# Patient Record
Sex: MALE | Race: BLACK OR AFRICAN AMERICAN | NOT HISPANIC OR LATINO | Employment: FULL TIME | URBAN - METROPOLITAN AREA
[De-identification: names, ages, dates, MRNs, and addresses within clinical notes are randomized per-mention and may not be internally consistent; named-entity substitution may affect disease eponyms.]

---

## 2017-02-23 ENCOUNTER — OFFICE VISIT (OUTPATIENT)
Dept: FAMILY MEDICINE | Facility: CLINIC | Age: 42
End: 2017-02-23
Payer: COMMERCIAL

## 2017-02-23 VITALS
WEIGHT: 220.88 LBS | TEMPERATURE: 98 F | SYSTOLIC BLOOD PRESSURE: 140 MMHG | HEART RATE: 83 BPM | OXYGEN SATURATION: 96 % | DIASTOLIC BLOOD PRESSURE: 100 MMHG | HEIGHT: 71 IN | BODY MASS INDEX: 30.92 KG/M2

## 2017-02-23 DIAGNOSIS — M25.511 ACUTE PAIN OF RIGHT SHOULDER: Primary | ICD-10-CM

## 2017-02-23 PROCEDURE — 99999 PR PBB SHADOW E&M-EST. PATIENT-LVL III: CPT | Mod: PBBFAC,,,

## 2017-02-23 PROCEDURE — 99213 OFFICE O/P EST LOW 20 MIN: CPT | Mod: 25,S$GLB,,

## 2017-02-23 PROCEDURE — 20610 DRAIN/INJ JOINT/BURSA W/O US: CPT | Mod: S$GLB,,,

## 2017-02-23 PROCEDURE — 3080F DIAST BP >= 90 MM HG: CPT | Mod: S$GLB,,,

## 2017-02-23 PROCEDURE — 3077F SYST BP >= 140 MM HG: CPT | Mod: S$GLB,,,

## 2017-02-23 PROCEDURE — 1160F RVW MEDS BY RX/DR IN RCRD: CPT | Mod: S$GLB,,,

## 2017-02-23 RX ORDER — HYDROCODONE BITARTRATE AND ACETAMINOPHEN 10; 325 MG/1; MG/1
1 TABLET ORAL 4 TIMES DAILY PRN
Qty: 40 TABLET | Refills: 0 | Status: SHIPPED | OUTPATIENT
Start: 2017-04-24 | End: 2017-05-24

## 2017-02-23 RX ORDER — TRIAMCINOLONE ACETONIDE 40 MG/ML
40 INJECTION, SUSPENSION INTRA-ARTICULAR; INTRAMUSCULAR
Status: COMPLETED | OUTPATIENT
Start: 2017-02-23 | End: 2017-02-23

## 2017-02-23 RX ADMIN — TRIAMCINOLONE ACETONIDE 40 MG: 40 INJECTION, SUSPENSION INTRA-ARTICULAR; INTRAMUSCULAR at 11:02

## 2017-02-23 NOTE — PROGRESS NOTES
Chief Complaint   Patient presents with    Shoulder Pain       HPI  Ranjith Peguero is a 41 y.o. male who presents to the office with very severe right shoulder pain, this is been gradually getting worse over the last week or 2.  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  There is been no traumatic injury or unusual activity for the patient.  Patient states that his pain is 8/10.  There is no radiation to the hand.  No loss of hand .  Patient has tried some Aleve and some sport tape with not much improvement.  Pt is known to me.    HPI    PAST MEDICAL HISTORY:  Past Medical History   Diagnosis Date    Hypertension        PAST SURGICAL HISTORY:  History reviewed. No pertinent past surgical history.    SOCIAL HISTORY:  Social History     Social History    Marital status:      Spouse name: N/A    Number of children: N/A    Years of education: N/A     Occupational History    Not on file.     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.  .  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         FAMILY HISTORY:  Family History   Problem Relation Age of Onset    Hypertension Mother     Hypertension Father        ALLERGIES AND MEDICATIONS: updated and reviewed.  Review of patient's allergies indicates:  No Known Allergies  Current Outpatient Prescriptions   Medication Sig Dispense Refill    allopurinol (ZYLOPRIM) 100 MG tablet Take 1 tablet (100 mg total) by mouth 3 (three) times daily. 30 tablet 2    amlodipine-benazepril 5-20 mg (LOTREL) 5-20 mg per capsule TAKE ONE CAPSULE BY MOUTH DAILY 90 capsule 1    indomethacin (INDOCIN) 50 MG capsule Take 1 capsule (50 mg total) by mouth 3 (three) times daily as needed. 45 capsule 3     "meloxicam (MOBIC) 15 MG tablet Take 15 mg by mouth once daily.      [START ON 4/24/2017] hydrocodone-acetaminophen 10-325mg (NORCO)  mg Tab Take 1 tablet by mouth 4 (four) times daily as needed. 40 tablet 0    triamcinolone acetonide 0.1% (KENALOG) 0.1 % ointment Apply topically 2 (two) times daily. 80 g 2     Current Facility-Administered Medications   Medication Dose Route Frequency Provider Last Rate Last Dose    triamcinolone acetonide injection 40 mg  40 mg Intramuscular 1 time in Clinic/HOD Jose Alberto Jr., MD           ROS  Review of Systems   Constitutional: Negative for appetite change and unexpected weight change.   Musculoskeletal: Positive for arthralgias and neck stiffness. Negative for back pain and joint swelling.       Physical Exam  Vitals:    02/23/17 0918   BP: (!) 140/100   Pulse: 83   Temp: 98.4 °F (36.9 °C)    Body mass index is 30.81 kg/(m^2).  Weight: 100.2 kg (220 lb 14.4 oz)   Height: 5' 11" (180.3 cm)     Physical Exam   Constitutional: He is oriented to person, place, and time. He appears well-developed and well-nourished. No distress.   Neck: Neck supple.   There is tenderness over the right trapezius ridge.   Musculoskeletal: Normal range of motion.   Right shoulder has full range of motion passively, definitely diminished actively.  Right subacromial area is definitely tender.   Neurological: He is alert and oriented to person, place, and time.   Psychiatric: He has a normal mood and affect.   Vitals reviewed.    Procedure note: After discussing options, time out, I injected 1 cc of Kenalog 40, 4 cc of 2% Xylocaine plain to the right subacromial space.  The patient tolerated this quite well.    Health Maintenance       Date Due Completion Date    TETANUS VACCINE 10/5/1991 ---    Lipid Panel 8/22/2016 8/22/2011          Assessment & Plan    1. Acute pain of right shoulder  We discussed several options, such as orthopedic referral, physical therapy, home management, patient " opted to have an injection to the right subacromial space.  - triamcinolone acetonide injection 40 mg; Inject 1 mL (40 mg total) into the muscle one time.  - hydrocodone-acetaminophen 10-325mg (NORCO)  mg Tab; Take 1 tablet by mouth 4 (four) times daily as needed.  Dispense: 40 tablet; Refill: 0      No Follow-up on file.    Additionally, I showed the patient range of motion exercises, he should use heat/ice, hopefully this will improve while the patient is off for the next 10 days or so.

## 2017-02-23 NOTE — MR AVS SNAPSHOT
[facility]  [address]  [place] [address]  Phone: [phone]  Fax: [phone]                  Ranjith Peguero   2017 9:20 AM   Office Visit    Description:  Male : 1975   Provider:  Jose Alberto Jr., MD   Department:  [facility]           Reason for Visit     Shoulder Pain           Diagnoses this Visit        Comments    Acute pain of right shoulder    -  Primary            To Do List           Goals (5 Years of Data)     None       These Medications        Disp Refills Start End    hydrocodone-acetaminophen 10-325mg (NORCO)  mg Tab 40 tablet 0 2017    Take 1 tablet by mouth 4 (four) times daily as needed. - Oral    Pharmacy: [id number] - [place]EN [address] AT [facility] #: [phone]         [facility] On Call     [facility] On Call Nurse Care Line -  Assistance  Registered nurses in the [facility] On Call Center provide clinical advisement, health education, appointment booking, and other advisory services.  Call for this free service at [phone].             Medications           Message regarding Medications     Verify the changes and/or additions to your medication regime listed below are the same as discussed with your clinician today.  If any of these changes or additions are incorrect, please notify your healthcare provider.        START taking these NEW medications        Refills    hydrocodone-acetaminophen 10-325mg (NORCO)  mg Tab 0    Starting on: 2017    Sig: Take 1 tablet by mouth 4 (four) times daily as needed.    Class: Print    Route: Oral      These medications were administered today        Dose Freq    triamcinolone acetonide injection 40 mg 40 mg Clinic/HOD 1 time    Sig: Inject 1 mL (40 mg total) into the muscle one time.    Class: Normal    Route: Intramuscular           Verify that the below list of medications is an accurate representation of the  "medications you are currently taking.  If none reported, the list may be blank. If incorrect, please contact your healthcare provider. Carry this list with you in case of emergency.           Current Medications     allopurinol (ZYLOPRIM) 100 MG tablet Take 1 tablet (100 mg total) by mouth 3 (three) times daily.    amlodipine-benazepril 5-20 mg (LOTREL) 5-20 mg per capsule TAKE ONE CAPSULE BY MOUTH DAILY    indomethacin (INDOCIN) 50 MG capsule Take 1 capsule (50 mg total) by mouth 3 (three) times daily as needed.    meloxicam (MOBIC) 15 MG tablet Take 15 mg by mouth once daily.    hydrocodone-acetaminophen 10-325mg (NORCO)  mg Tab Starting on Apr 24, 2017. Take 1 tablet by mouth 4 (four) times daily as needed.    triamcinolone acetonide 0.1% (KENALOG) 0.1 % ointment Apply topically 2 (two) times daily.           Clinical Reference Information           Your Vitals Were     BP Pulse Temp Height Weight SpO2    140/100 (BP Location: Left arm, Patient Position: Sitting, BP Method: Manual) 83 98.4 °F (36.9 °C) 5' 11" (1.803 m) 100.2 kg (220 lb 14.4 oz) 96%    BMI                30.81 kg/m2          Blood Pressure          Most Recent Value    BP  (!)  140/100      Allergies as of 2/23/2017     No Known Allergies      Immunizations Administered on Date of Encounter - 2/23/2017     None      [facility] Sign-Up     Activating your [facility] account is as easy as 1-2-3!     1) Visit [email or web address], select Sign Up Now, enter this activation code and your date of birth, then select Next.  [email or web address]  Expires: 4/9/2017  9:52 AM      2) Create a username and password to use when you visit [facility] in the future and select a security question in case you lose your password and select Next.    3) Enter your e-mail address and click Sign Up!    Additional Information  If you have questions, please e-mail [email or web address] or call [phone] to talk to our [facility] staff. Remember, [facility] is NOT to be used for " urgent needs. For medical emergencies, dial 911.         Instructions      Shoulder Impingement Syndrome  The rotator cuff is a group of muscles and tendons that surround the shoulder joint. These muscles and tendons hold the arm in its joint. They help the shoulder move. The rotator cuff muscles and tendons can become irritated from repeated rubbing against the shoulder bone. This is called shoulder impingement syndrome or rotator cuff tendonitis.    If your case is mild, you may only need to rest the shoulder and then do certain exercises to strengthen the muscles. You can also take anti-inflammatory medicines. Steroid injections into the shoulder can ease inflammation. But you can have only a limited number of these. If the condition gets worse, your shoulder muscles may become thin and weak. This can lead to a rotator cuff tear.  Symptoms of shoulder impingement syndrome may include:  · Shoulder pain that gets worse when you raise your arm overhead  · Weakness of the shoulder muscles when you use your arm overhead  · Popping and clicking when you move your shoulder  · Shoulder pain that wakes you up at night, especially when you sleep on the affected shoulder  · Sudden pain in your shoulder when you lift or reach  Home care  Follow these tips to take care of yourself at home:  · Avoid activities that make your pain worse. These include raising your arms overhead, repeating the same motion over and over, or lifting heavy objects.  · Dont hold your arm in one position for a long time. Keep it moving.  · Put an ice pack on the sore area for 20 minutes every 1 to 2 hours for the first day. You can make an ice pack by putting ice cubes in a plastic bag. Wrap the bag in a towel before putting it on your shoulder. A frozen bag of peas or something similar can also be used as an ice pack. Use the ice packs 3 to 4 times a day for the next 2 days. Continue using the ice to relieve of pain and swelling as needed.  · You  may take acetaminophen or ibuprofen to control pain, unless another medicine was prescribed. If prednisone was prescribed, dont take anti-inflammatory medicines. If you have chronic liver or kidney disease or ever had a stomach ulcer or gastrointestinal bleeding, talk with your doctor before using these medicines.  · After your symptoms ease, you may get physical therapy or start a home exercise program. This can strengthen your shoulder muscles and help your range of motion. Talk with your doctor about what is best for your condition.  Follow-up care  Follow up with your healthcare provider, or as advised.  When to seek medical advice  Call your healthcare provider right away if any of these occur:  · Shoulder pain that gets worse and wakes you up at night  · Your shoulder or arm swells  · Numbness, tingling, or pain that travels down the arm to the hand  · Loss of shoulder strength  · Fever or chills  Date Last Reviewed: 8/1/2016  © 2554-9405 [employer]. [address] [place] [address]. All rights reserved. This information is not intended as a substitute for professional medical care. Always follow your healthcare professional's instructions.             Language Assistance Services     ATTENTION: Language assistance services are available, free of charge. Please call [phone].      ATENCIÓN: Si chula maría, tiene a guy disposición servicios gratuitos de asistencia lingüística. Llame al [phone].     ROSY Ý: N?u b?n nói Ti?ng Vi?t, có các d?ch v? h? tr? ngôn ng? mi?n phí dành cho b?n. G?i s? [phone].         [facility] complies with applicable Federal civil rights laws and does not discriminate on the basis of race, color, national origin, age, disability, or sex.

## 2017-02-23 NOTE — PATIENT INSTRUCTIONS
Shoulder Impingement Syndrome  The rotator cuff is a group of muscles and tendons that surround the shoulder joint. These muscles and tendons hold the arm in its joint. They help the shoulder move. The rotator cuff muscles and tendons can become irritated from repeated rubbing against the shoulder bone. This is called shoulder impingement syndrome or rotator cuff tendonitis.    If your case is mild, you may only need to rest the shoulder and then do certain exercises to strengthen the muscles. You can also take anti-inflammatory medicines. Steroid injections into the shoulder can ease inflammation. But you can have only a limited number of these. If the condition gets worse, your shoulder muscles may become thin and weak. This can lead to a rotator cuff tear.  Symptoms of shoulder impingement syndrome may include:  · Shoulder pain that gets worse when you raise your arm overhead  · Weakness of the shoulder muscles when you use your arm overhead  · Popping and clicking when you move your shoulder  · Shoulder pain that wakes you up at night, especially when you sleep on the affected shoulder  · Sudden pain in your shoulder when you lift or reach  Home care  Follow these tips to take care of yourself at home:  · Avoid activities that make your pain worse. These include raising your arms overhead, repeating the same motion over and over, or lifting heavy objects.  · Dont hold your arm in one position for a long time. Keep it moving.  · Put an ice pack on the sore area for 20 minutes every 1 to 2 hours for the first day. You can make an ice pack by putting ice cubes in a plastic bag. Wrap the bag in a towel before putting it on your shoulder. A frozen bag of peas or something similar can also be used as an ice pack. Use the ice packs 3 to 4 times a day for the next 2 days. Continue using the ice to relieve of pain and swelling as needed.  · You may take acetaminophen or ibuprofen to control pain, unless another  medicine was prescribed. If prednisone was prescribed, dont take anti-inflammatory medicines. If you have chronic liver or kidney disease or ever had a stomach ulcer or gastrointestinal bleeding, talk with your doctor before using these medicines.  · After your symptoms ease, you may get physical therapy or start a home exercise program. This can strengthen your shoulder muscles and help your range of motion. Talk with your doctor about what is best for your condition.  Follow-up care  Follow up with your healthcare provider, or as advised.  When to seek medical advice  Call your healthcare provider right away if any of these occur:  · Shoulder pain that gets worse and wakes you up at night  · Your shoulder or arm swells  · Numbness, tingling, or pain that travels down the arm to the hand  · Loss of shoulder strength  · Fever or chills  Date Last Reviewed: 8/1/2016  © 6200-1848 [employer]. [address] [place] [address], [place] [address]. All rights reserved. This information is not intended as a substitute for professional medical care. Always follow your healthcare professional's instructions.

## 2017-06-14 RX ORDER — AMLODIPINE AND BENAZEPRIL HYDROCHLORIDE 5; 20 MG/1; MG/1
1 CAPSULE ORAL DAILY
Qty: 90 CAPSULE | Refills: 1 | Status: SHIPPED | OUTPATIENT
Start: 2017-06-14 | End: 2018-03-16 | Stop reason: SDUPTHER

## 2017-08-03 ENCOUNTER — OFFICE VISIT (OUTPATIENT)
Dept: FAMILY MEDICINE | Facility: CLINIC | Age: 42
End: 2017-08-03
Payer: COMMERCIAL

## 2017-08-03 VITALS
OXYGEN SATURATION: 97 % | RESPIRATION RATE: 17 BRPM | SYSTOLIC BLOOD PRESSURE: 142 MMHG | DIASTOLIC BLOOD PRESSURE: 90 MMHG | HEIGHT: 71 IN | BODY MASS INDEX: 29.5 KG/M2 | TEMPERATURE: 98 F | HEART RATE: 80 BPM | WEIGHT: 210.75 LBS

## 2017-08-03 DIAGNOSIS — M25.521 RIGHT ELBOW PAIN: Primary | ICD-10-CM

## 2017-08-03 DIAGNOSIS — M25.521 RIGHT ELBOW PAIN: ICD-10-CM

## 2017-08-03 PROCEDURE — 3008F BODY MASS INDEX DOCD: CPT | Mod: S$GLB,,, | Performed by: INTERNAL MEDICINE

## 2017-08-03 PROCEDURE — 99214 OFFICE O/P EST MOD 30 MIN: CPT | Mod: S$GLB,,, | Performed by: INTERNAL MEDICINE

## 2017-08-03 PROCEDURE — 99999 PR PBB SHADOW E&M-EST. PATIENT-LVL III: CPT | Mod: PBBFAC,,, | Performed by: INTERNAL MEDICINE

## 2017-08-03 RX ORDER — NAPROXEN 500 MG/1
TABLET ORAL
Qty: 180 TABLET | Refills: 0 | OUTPATIENT
Start: 2017-08-03

## 2017-08-03 RX ORDER — METHYLPREDNISOLONE 4 MG/1
TABLET ORAL
Qty: 21 TABLET | Refills: 0 | Status: SHIPPED | OUTPATIENT
Start: 2017-08-03 | End: 2017-11-09 | Stop reason: ALTCHOICE

## 2017-08-03 RX ORDER — NAPROXEN 500 MG/1
500 TABLET ORAL 2 TIMES DAILY
Qty: 60 TABLET | Refills: 0 | Status: SHIPPED | OUTPATIENT
Start: 2017-08-03 | End: 2017-09-15 | Stop reason: SDUPTHER

## 2017-08-04 NOTE — PROGRESS NOTES
Subjective:       Patient ID: Ranjith Peguero is a 41 y.o. male.    Chief Complaint: Elbow Pain (right  )    The patient is a  who complains of 3 month history of worsening pain in his right elbow.  He knows now that he has some weakness and is beginning to drop things.  His pain at its worse is 7-8 out of 10.      Elbow Pain   This is a new problem. The current episode started more than 1 month ago. The problem occurs intermittently. The problem has been gradually worsening. Associated symptoms include arthralgias and weakness. Pertinent negatives include no joint swelling or numbness. He has tried NSAIDs for the symptoms. The treatment provided no relief.     Review of Systems   Musculoskeletal: Positive for arthralgias. Negative for joint swelling.   Neurological: Positive for weakness. Negative for numbness.       Objective:      Physical Exam   Constitutional: He is oriented to person, place, and time. He appears well-developed and well-nourished. No distress.   HENT:   Head: Normocephalic and atraumatic.   Musculoskeletal:        Right elbow: He exhibits normal range of motion, no swelling and no effusion. No tenderness found.   Neurological: He is alert and oriented to person, place, and time.   Skin: Skin is warm and dry. No rash noted.   Psychiatric: He has a normal mood and affect.   Vitals reviewed.      Assessment:       1. Right elbow pain        Plan:       Ranjith was seen today for elbow pain.    Diagnoses and all orders for this visit:    Right elbow pain - , tendonitis.  F/u if no improvement  -     methylPREDNISolone (MEDROL, ANNIE,) 4 mg tablet; use as directed  -     naproxen (EC NAPROSYN) 500 MG EC tablet; Take 1 tablet (500 mg total) by mouth 2 (two) times daily.

## 2017-09-15 DIAGNOSIS — M25.521 RIGHT ELBOW PAIN: ICD-10-CM

## 2017-09-15 RX ORDER — NAPROXEN 500 MG/1
TABLET ORAL
Qty: 60 TABLET | Refills: 0 | Status: SHIPPED | OUTPATIENT
Start: 2017-09-15 | End: 2018-02-24

## 2017-09-23 ENCOUNTER — OFFICE VISIT (OUTPATIENT)
Dept: FAMILY MEDICINE | Facility: CLINIC | Age: 42
End: 2017-09-23
Payer: COMMERCIAL

## 2017-09-23 VITALS
HEART RATE: 86 BPM | DIASTOLIC BLOOD PRESSURE: 80 MMHG | WEIGHT: 211.88 LBS | SYSTOLIC BLOOD PRESSURE: 110 MMHG | HEIGHT: 71 IN | RESPIRATION RATE: 17 BRPM | TEMPERATURE: 98 F | OXYGEN SATURATION: 97 % | BODY MASS INDEX: 29.66 KG/M2

## 2017-09-23 DIAGNOSIS — M62.838 MUSCLE SPASM: Primary | ICD-10-CM

## 2017-09-23 LAB
BILIRUB SERPL-MCNC: NORMAL MG/DL
BLOOD URINE, POC: NORMAL
COLOR, POC UA: YELLOW
GLUCOSE UR QL STRIP: NORMAL
KETONES UR QL STRIP: NORMAL
LEUKOCYTE ESTERASE URINE, POC: NORMAL
NITRITE, POC UA: NORMAL
PH, POC UA: 5
PROTEIN, POC: NORMAL
SPECIFIC GRAVITY, POC UA: 1020
UROBILINOGEN, POC UA: NORMAL

## 2017-09-23 PROCEDURE — 99213 OFFICE O/P EST LOW 20 MIN: CPT | Mod: 25,S$GLB,, | Performed by: NURSE PRACTITIONER

## 2017-09-23 PROCEDURE — 99999 PR PBB SHADOW E&M-EST. PATIENT-LVL IV: CPT | Mod: PBBFAC,,, | Performed by: NURSE PRACTITIONER

## 2017-09-23 PROCEDURE — 3074F SYST BP LT 130 MM HG: CPT | Mod: S$GLB,,, | Performed by: NURSE PRACTITIONER

## 2017-09-23 PROCEDURE — 81001 URINALYSIS AUTO W/SCOPE: CPT | Mod: S$GLB,,, | Performed by: NURSE PRACTITIONER

## 2017-09-23 PROCEDURE — 3008F BODY MASS INDEX DOCD: CPT | Mod: S$GLB,,, | Performed by: NURSE PRACTITIONER

## 2017-09-23 PROCEDURE — 3079F DIAST BP 80-89 MM HG: CPT | Mod: S$GLB,,, | Performed by: NURSE PRACTITIONER

## 2017-09-23 PROCEDURE — 96372 THER/PROPH/DIAG INJ SC/IM: CPT | Mod: S$GLB,,, | Performed by: NURSE PRACTITIONER

## 2017-09-23 RX ORDER — METHOCARBAMOL 500 MG/1
TABLET, FILM COATED ORAL
Qty: 64 TABLET | Refills: 0 | Status: SHIPPED | OUTPATIENT
Start: 2017-09-23 | End: 2018-02-24

## 2017-09-23 RX ORDER — KETOROLAC TROMETHAMINE 30 MG/ML
30 INJECTION, SOLUTION INTRAMUSCULAR; INTRAVENOUS
Status: COMPLETED | OUTPATIENT
Start: 2017-09-23 | End: 2017-09-23

## 2017-09-23 RX ADMIN — KETOROLAC TROMETHAMINE 30 MG: 30 INJECTION, SOLUTION INTRAMUSCULAR; INTRAVENOUS at 01:09

## 2017-09-23 NOTE — PATIENT INSTRUCTIONS
Back Spasm (No Trauma)    Spasm of the back muscles can occur after a sudden forceful twisting or bending force (such as in a car accident), after a simple awkward movement, or after lifting something heavy with poor body positioning. In any case, muscle spasm adds to the pain. Sleeping in an awkward position or on a poor quality mattress can also cause this. Some people respond to emotional stress by tensing the muscles of their back.  Pain that continues may need further evaluation or other types of treatment such as physical therapy.  You don't always need X-rays for the initial evaluation of back pain, unless you had a physical injury such as from a car accident or fall. If your pain continues and doesn't respond to medical treatment, X-rays and other tests may then be done.   Home care  · As soon as possible, start sitting or walking again to avoid problems from prolonged bed rest (muscle weakness, worsening back stiffness and pain, blood clots in the legs).  · When in bed, try to find a position of comfort. A firm mattress is best. Try lying flat on your back with pillows under your knees. You can also try lying on your side with your knees bent up toward your chest and a pillow between your knees.  · Avoid prolonged sitting, long car rides, or travel. This puts more stress on the lower back than standing or walking.   · During the first 24 to 72 hours after an injury or flare-up, apply an ice pack to the painful area for 20 minutes, then remove it for 20 minutes. Do this over a period of 60 to 90 minutes or several times a day. This will reduce swelling and pain. Always wrap ice packs in a thin towel.  · You can start with ice, then switch to heat. Heat (hot shower, hot bath, or heating pad) reduces pain, and works well for muscle spasms. Apply heat to the painful area for 20 minutes, then remove it for 20 minutes. Do this over a period of 60 to 90 minutes or several times a day. Do not sleep on a heating  pad as it can burn or damage skin.  · Alternate ice and heat therapies.  · Be aware of safe lifting methods and do not lift anything over 15 pounds until all the pain is gone.  Gentle stretching will help your back heal faster. Do this simple routine 2 to 3 times a day until your back is feeling better.  · Lie on your back with your knees bent and both feet on the ground  · Slowly raise your left knee to your chest as you flatten your lower back against the floor. Hold for 20 to 30 seconds.  · Relax and repeat the exercise with your right knee.  · Do 2 to 3 of these exercises for each leg.  · Repeat, hugging both knees to your chest at the same time.  · Do not bounce, but use a gentle pull.  Medicines  Talk to your doctor before using medicine, especially if you have other medical problems or are taking other medicines.  You may use acetaminophen or ibuprofen to control pain, unless your healthcare provider prescribed another pain medicine. If you have a chronic condition such as diabetes, liver or kidney disease, stomach ulcer, or gastrointestinal bleeding, or are taking blood thinners, talk with your healthcare provider before taking any medicines.  Be careful if you are given prescription pain medicine, narcotics, or medicine for muscle spasm. They can cause drowsiness, affect your coordination, reflexes, or judgment. Do not drive or operate heavy machinery when taking these medicines. Take pain medicine only as prescribed by your healthcare provider.  Follow-up care  Follow up with your doctor, or as advised. Physical therapy or further tests may be needed.  If X-rays were taken, they may be reviewed by a radiologist. You will be notified of any new findings that may affect your care.  Call 911  Seek emergency medical care if any of these occur:  · Trouble breathing  · Confusion  · Drowsiness or trouble awakening  · Fainting or loss of consciousness  · Rapid or very slow heart rate  · Loss of bowel or bladder  control  When to seek medical advice  Call your healthcare provider right away if any of these occur:  · Pain becomes worse or spreads to your legs  · Weakness or numbness in one or both legs  · Numbness in the groin or genital area  · Unexplained fever over 100.4ºF (38.0ºC)  · Burning or pain when passing urine  Date Last Reviewed: 6/1/2016  © 2097-5480 [employer]. [address] [place] [address], [place] [id number]. All rights reserved. This information is not intended as a substitute for professional medical care. Always follow your healthcare professional's instructions.

## 2017-09-23 NOTE — PROGRESS NOTES
Subjective:       Patient ID: Ranjith Peguero is a 41 y.o. male who presents to urgent care with complaints of lower to mid back pain for about a week, denies any trauma prior to developing pain, has been using otc meds with little help, works for [employer], says urine is dark, has been sweating a lot and he says he drinks a lot of water.         Chief Complaint: Low-back Pain    HPI  Review of Systems   Constitutional: Positive for activity change. Negative for appetite change, chills, diaphoresis, fatigue and fever.   Respiratory: Negative for apnea, cough, choking, chest tightness and shortness of breath.    Cardiovascular: Negative for chest pain, palpitations and leg swelling.   Genitourinary: Positive for flank pain. Negative for difficulty urinating, dysuria, enuresis, frequency, genital sores and hematuria.   Musculoskeletal: Positive for back pain. Negative for gait problem, joint swelling, myalgias and neck pain.   Neurological: Negative for dizziness, facial asymmetry, light-headedness and headaches.   Psychiatric/Behavioral: Negative for agitation, behavioral problems, confusion, decreased concentration and dysphoric mood.       Objective:      Physical Exam   Constitutional: He is oriented to person, place, and time. He appears well-developed and well-nourished. No distress.   Cardiovascular: Normal rate, regular rhythm and normal heart sounds.    Pulmonary/Chest: Effort normal and breath sounds normal. No respiratory distress. He has no wheezes.   Abdominal: Soft. Bowel sounds are normal. He exhibits no distension. There is no tenderness. There is no guarding.   Musculoskeletal: Normal range of motion. He exhibits no edema or deformity.        Arms:  Neurological: He is alert and oriented to person, place, and time. No cranial nerve deficit.   Skin: Skin is warm and dry. No rash noted.   Psychiatric: He has a normal mood and affect. His behavior is normal. Judgment and thought content normal.   Nursing  note and vitals reviewed.      Assessment:       1. Muscle spasm        Plan:       Ranjith was seen today for low-back pain.    Diagnoses and all orders for this visit:    Muscle spasm  -     POCT urinalysis, dipstick or tablet reag    Other orders  -     ketorolac injection 30 mg; Inject 30 mg into the muscle one time.  -     methocarbamol (ROBAXIN) 500 MG Tab; 1500mg every 6h x 2d;  then 1000mg every 6h x 5d        Education  Pt has been given instructions populated from [employer] database and those entered into patient instructions field and has verbalized understanding of the after visit summary and information contained wherein.    Follow Up  if no better 3-5 days fu with pcp  Work slip given.    In Case of Emergency   May go to ER for acute shortness of breath, lightheadedness, fever, or any other emergent complaints or changes in condition.

## 2017-09-23 NOTE — LETTER
September 23, 2017      Ranjith Peguero   [address] [facility] [address]             [address] [address]  Phone: [phone]  Fax: [phone] Ranjith Peguero    Was treated here on 09/23/2017    May Return to work on 09/25/2017.    No Restrictions            Karime Carr NP

## 2017-11-09 ENCOUNTER — OFFICE VISIT (OUTPATIENT)
Dept: FAMILY MEDICINE | Facility: CLINIC | Age: 42
End: 2017-11-09
Payer: COMMERCIAL

## 2017-11-09 VITALS
WEIGHT: 215.81 LBS | DIASTOLIC BLOOD PRESSURE: 82 MMHG | OXYGEN SATURATION: 97 % | RESPIRATION RATE: 17 BRPM | SYSTOLIC BLOOD PRESSURE: 136 MMHG | HEART RATE: 75 BPM | HEIGHT: 71 IN | BODY MASS INDEX: 30.21 KG/M2 | TEMPERATURE: 98 F

## 2017-11-09 DIAGNOSIS — S39.012A LUMBAR STRAIN, INITIAL ENCOUNTER: ICD-10-CM

## 2017-11-09 DIAGNOSIS — M25.521 RIGHT ELBOW PAIN: ICD-10-CM

## 2017-11-09 DIAGNOSIS — J20.9 ACUTE BRONCHITIS, UNSPECIFIED ORGANISM: Primary | ICD-10-CM

## 2017-11-09 PROCEDURE — 99999 PR PBB SHADOW E&M-EST. PATIENT-LVL III: CPT | Mod: PBBFAC,,, | Performed by: INTERNAL MEDICINE

## 2017-11-09 PROCEDURE — 99214 OFFICE O/P EST MOD 30 MIN: CPT | Mod: S$GLB,,, | Performed by: INTERNAL MEDICINE

## 2017-11-09 RX ORDER — BENZONATATE 200 MG/1
200 CAPSULE ORAL 3 TIMES DAILY PRN
Qty: 30 CAPSULE | Refills: 0 | Status: SHIPPED | OUTPATIENT
Start: 2017-11-09 | End: 2017-11-19

## 2017-11-09 RX ORDER — PREDNISONE 20 MG/1
TABLET ORAL
Qty: 7 TABLET | Refills: 0 | Status: SHIPPED | OUTPATIENT
Start: 2017-11-09 | End: 2018-02-24

## 2017-11-09 RX ORDER — ALBUTEROL SULFATE 90 UG/1
2 AEROSOL, METERED RESPIRATORY (INHALATION) EVERY 6 HOURS PRN
Qty: 18 G | Refills: 0 | Status: SHIPPED | OUTPATIENT
Start: 2017-11-09 | End: 2018-02-24

## 2017-11-09 NOTE — PROGRESS NOTES
Subjective:       Patient ID: Ranjith Peguero is a 42 y.o. male.    Chief Complaint: Cough and Chest congestion    Today he complains of cold symptoms which began 2 weeks ago with chills, sweats, rash noted and cough.  His symptoms have improved with the exception of a persistent cough which is productive of green sputum at times.  He however is unable to produce much mucus.  He complains of shortness of breath and chest pressure intermittently.  He is tried Mucinex as well as Mayi-Brownfield with little improvement.  He also complains of continued intermittent right elbow pain which interferes with work.  He often has to take 2-3 days off as a result.  He also complains of intermittent pain in his back and other joints over the years.        Review of Systems   Constitutional: Positive for chills and diaphoresis.   HENT: Positive for congestion. Negative for ear pain and sore throat.    Respiratory: Positive for cough and shortness of breath. Negative for wheezing.        Objective:      Physical Exam   Constitutional: He is oriented to person, place, and time. He appears well-developed and well-nourished. No distress.   HENT:   Head: Normocephalic and atraumatic.   Right Ear: Tympanic membrane and ear canal normal.   Left Ear: Tympanic membrane and ear canal normal.   Nose: Nose normal. No mucosal edema.   Mouth/Throat: Oropharynx is clear and moist. No oropharyngeal exudate.   Eyes: Conjunctivae and EOM are normal. Pupils are equal, round, and reactive to light. No scleral icterus.   Neck: Normal range of motion. Neck supple.   Cardiovascular: Normal rate, regular rhythm and normal heart sounds.  Exam reveals no gallop and no friction rub.    No murmur heard.  Pulmonary/Chest: Effort normal and breath sounds normal. No respiratory distress. He has no wheezes. He has no rales.   Lymphadenopathy:     He has no cervical adenopathy.   Neurological: He is alert and oriented to person, place, and time. He has normal  reflexes.   Skin: Skin is warm and dry. No rash noted.   Psychiatric: He has a normal mood and affect.   Vitals reviewed.      Assessment:       1. Acute bronchitis, unspecified organism    2. Right elbow pain    3. Lumbar strain, initial encounter        Plan:       Ranjith was seen today for cough and chest congestion.    Diagnoses and all orders for this visit:    Acute bronchitis, unspecified organism - recent URI now with persistent cough, chest tightness and reported SOB.  We'll treat as below.  Follow-up if persistent.  -     benzonatate (TESSALON) 200 MG capsule; Take 1 capsule (200 mg total) by mouth 3 (three) times daily as needed for Cough.  -     predniSONE (DELTASONE) 20 MG tablet; 2 tabs daily for 2 days then 1 tab daily for 2 days then one half tab for 2 days  -     albuterol 90 mcg/actuation inhaler; Inhale 2 puffs into the lungs every 6 (six) hours as needed for Shortness of Breath. Rescue    Right elbow pain - continue NSAIDs when necessary    Lumbar strain, initial encounter - no acute issues today.       follow-up when necessary

## 2017-11-24 ENCOUNTER — TELEPHONE (OUTPATIENT)
Dept: FAMILY MEDICINE | Facility: CLINIC | Age: 42
End: 2017-11-24

## 2018-02-24 ENCOUNTER — OFFICE VISIT (OUTPATIENT)
Dept: FAMILY MEDICINE | Facility: CLINIC | Age: 43
End: 2018-02-24
Payer: COMMERCIAL

## 2018-02-24 VITALS
HEIGHT: 71 IN | WEIGHT: 216.94 LBS | HEART RATE: 68 BPM | OXYGEN SATURATION: 99 % | BODY MASS INDEX: 30.37 KG/M2 | DIASTOLIC BLOOD PRESSURE: 72 MMHG | SYSTOLIC BLOOD PRESSURE: 116 MMHG

## 2018-02-24 DIAGNOSIS — M10.9 GOUT, UNSPECIFIED CAUSE, UNSPECIFIED CHRONICITY, UNSPECIFIED SITE: Primary | ICD-10-CM

## 2018-02-24 PROCEDURE — 99214 OFFICE O/P EST MOD 30 MIN: CPT | Mod: 25,S$GLB,, | Performed by: NURSE PRACTITIONER

## 2018-02-24 PROCEDURE — 99999 PR PBB SHADOW E&M-EST. PATIENT-LVL III: CPT | Mod: PBBFAC,,, | Performed by: NURSE PRACTITIONER

## 2018-02-24 PROCEDURE — 3008F BODY MASS INDEX DOCD: CPT | Mod: S$GLB,,, | Performed by: NURSE PRACTITIONER

## 2018-02-24 PROCEDURE — 96372 THER/PROPH/DIAG INJ SC/IM: CPT | Mod: S$GLB,,, | Performed by: FAMILY MEDICINE

## 2018-02-24 RX ORDER — DEXAMETHASONE SODIUM PHOSPHATE 4 MG/ML
8 INJECTION, SOLUTION INTRA-ARTICULAR; INTRALESIONAL; INTRAMUSCULAR; INTRAVENOUS; SOFT TISSUE
Status: COMPLETED | OUTPATIENT
Start: 2018-02-24 | End: 2018-02-24

## 2018-02-24 RX ORDER — KETOROLAC TROMETHAMINE 30 MG/ML
30 INJECTION, SOLUTION INTRAMUSCULAR; INTRAVENOUS
Status: COMPLETED | OUTPATIENT
Start: 2018-02-24 | End: 2018-02-24

## 2018-02-24 RX ORDER — COLCHICINE 0.6 MG/1
TABLET ORAL
Qty: 30 TABLET | Refills: 0 | Status: SHIPPED | OUTPATIENT
Start: 2018-02-24 | End: 2018-09-26 | Stop reason: SDUPTHER

## 2018-02-24 RX ADMIN — DEXAMETHASONE SODIUM PHOSPHATE 8 MG: 4 INJECTION, SOLUTION INTRA-ARTICULAR; INTRALESIONAL; INTRAMUSCULAR; INTRAVENOUS; SOFT TISSUE at 09:02

## 2018-02-24 RX ADMIN — KETOROLAC TROMETHAMINE 30 MG: 30 INJECTION, SOLUTION INTRAMUSCULAR; INTRAVENOUS at 09:02

## 2018-02-24 NOTE — PROGRESS NOTES
Subjective:       Patient ID: Ranjith Peguero is a 42 y.o. male.    Chief Complaint: Gout    HPI Mr Peguero is here for Great toe pain for the past 4 days, he's been treated for gout in the past.  He has been eating more fish lately.  He has tried OTC NSAIds without relief.   Review of Systems   Constitutional: Negative for fever.   Respiratory: Negative.    Cardiovascular: Negative.    Musculoskeletal: Positive for arthralgias.       Objective:      Physical Exam   Constitutional: He is oriented to person, place, and time. He appears well-developed and well-nourished. He does not appear ill. No distress.   HENT:   Head: Normocephalic and atraumatic.   Cardiovascular: Normal rate, regular rhythm and normal heart sounds.  Exam reveals no friction rub.    No murmur heard.  Pulmonary/Chest: Effort normal and breath sounds normal. No respiratory distress. He has no wheezes.   Musculoskeletal: Normal range of motion.        Feet:    Neurological: He is alert and oriented to person, place, and time.   Skin: Skin is warm and dry.   Vitals reviewed.      Assessment:       1. Gout, unspecified cause, unspecified chronicity, unspecified site        Plan:       Gout, unspecified cause, unspecified chronicity, unspecified site  -     colchicine 0.6 mg tablet; 1.2 mg at the first sign of flare, followed in 1 hour with a single dose of 0.6 mg (maximum: 1.8 mg within 1 hour).  Dispense: 30 tablet; Refill: 0  -     dexamethasone injection 8 mg; Inject 2 mLs (8 mg total) into the muscle one time.  -     ketorolac injection 30 mg; Inject 30 mg into the muscle one time.    Follow up with primary care provider if not improved  Go to ER for new, worse or concerning symptoms

## 2018-02-24 NOTE — LETTER
February 24, 2018      [facility]  [address] [facility]  [place] [address]  Phone: [phone]  Fax: [phone]       Patient: Ranjith Peguero   YOB: 1975  Date of Visit: 02/24/2018    To Whom It May Concern:    Desiree Peguero  was at [facility] on 02/24/2018. He may return to work/school on 02/26/2018 with no restrictions. If you have any questions or concerns, or if I can be of further assistance, please do not hesitate to contact me.    Sincerely,    Juanita De La Cruz NP-C

## 2018-02-24 NOTE — PATIENT INSTRUCTIONS
Follow up with primary care provider if not improved  Go to ER for new, worse or concerning symptoms    Gout Diet  Gout is a painful condition caused by an excess of uric acid, a waste product made by the body. Uric acid forms crystals that collect in the joints. The immune response to these crystals brings on symptoms of joint pain and swelling. This is called a gout attack. Often, medications and diet changes are combined to manage gout. Below are some guidelines for changing your diet to help you manage gout and prevent attacks. Your health care provider will help you determine the best eating plan for you.     Eating to manage gout  Weight loss for those who are overweight may help reduce gout attacks.  Eat less of these foods  Eating too many foods containing purines may raise the levels of uric acid in your body. This raises your risk for a gout attack. Try to limit these foods and drinks:  · Alcohol, such as beer and red wine. You may be told to avoid alcohol completely.  · Soft drinks that contain sugar or high fructose corn syrup  · Certain fish, including anchovies, sardines, fish eggs, and herring  · Shellfish  · Certain meats, such as red meat, hot dogs, luncheon meats, and turkey  · Organ meats, such as liver, kidneys, and sweetbreads  · Legumes, such as dried beans and peas  · Other high fat foods such as gravy, whole milk, and high fat cheeses  · Vegetables such as asparagus, cauliflower, spinach, and mushrooms used to be thought to contribute to an increased risk for a gout attack, but recent studies show that high purine vegetables don't increase the risk for a gout attack.  Eat more of these foods  Other foods may be helpful for people with gout. Add some of these foods to your diet:  · Cherries contain chemicals that may lower uric acid.  · Omega fatty acids. These are found in some fatty fish such as salmon, certain oils (flax, olive, or nut), and nuts themselves. Omega fatty acids may help  prevent inflammation due to gout.  · Dairy products that are low-fat or fat-free, such as cheese and yogurt  · Complex carbohydrate foods, including whole grains, brown rice, oats, and beans  · Coffee, in moderation  · Water, approximately 64 ounces per day  Follow-up care  Follow up with your healthcare provider as advised.  When to seek medical advice  Call your healthcare provider right away if any of these occur:  · Return of gout symptoms, usually at night:  · Severe pain, swelling, and heat in a joint, especially the base of the big toe  · Affected joint is hard to move  · Skin of the affected joint is purple or red  · Fever of 100.4°F (38°C) or higher  · Pain that doesn't get better even with prescribed medicine   Date Last Reviewed: 1/12/2016  © 9164-3486 [employer]. [address] [place] [address], [place] [address]. All rights reserved. This information is not intended as a substitute for professional medical care. Always follow your healthcare professional's instructions.

## 2018-02-24 NOTE — LETTER
February 24, 2018      [facility]  [address] [facility]  [place] [address]  Phone: [phone]  Fax: [phone]       Patient: Ranjith Peguero   YOB: 1975  Date of Visit: 02/24/2018    To Whom It May Concern:    Desiree Peguero  was at [facility] on 02/24/2018. He may return to work/school on 02/26/2018 with no restrictions. If you have any questions or concerns, or if I can be of further assistance, please do not hesitate to contact me.    Sincerely,    Juanita De La Cruz NP-C

## 2018-03-16 ENCOUNTER — OFFICE VISIT (OUTPATIENT)
Dept: FAMILY MEDICINE | Facility: CLINIC | Age: 43
End: 2018-03-16
Payer: COMMERCIAL

## 2018-03-16 VITALS
RESPIRATION RATE: 20 BRPM | HEIGHT: 71 IN | OXYGEN SATURATION: 96 % | TEMPERATURE: 98 F | WEIGHT: 217.81 LBS | SYSTOLIC BLOOD PRESSURE: 136 MMHG | BODY MASS INDEX: 30.49 KG/M2 | DIASTOLIC BLOOD PRESSURE: 86 MMHG | HEART RATE: 87 BPM

## 2018-03-16 DIAGNOSIS — I10 ESSENTIAL HYPERTENSION: ICD-10-CM

## 2018-03-16 DIAGNOSIS — Z00.00 ANNUAL PHYSICAL EXAM: Primary | ICD-10-CM

## 2018-03-16 PROCEDURE — 99396 PREV VISIT EST AGE 40-64: CPT | Mod: S$GLB,,, | Performed by: NURSE PRACTITIONER

## 2018-03-16 PROCEDURE — 99999 PR PBB SHADOW E&M-EST. PATIENT-LVL III: CPT | Mod: PBBFAC,,, | Performed by: NURSE PRACTITIONER

## 2018-03-16 RX ORDER — AMLODIPINE AND BENAZEPRIL HYDROCHLORIDE 5; 20 MG/1; MG/1
1 CAPSULE ORAL DAILY
Qty: 90 CAPSULE | Refills: 1 | Status: SHIPPED | OUTPATIENT
Start: 2018-03-16 | End: 2018-09-26 | Stop reason: SDUPTHER

## 2018-03-16 NOTE — PROGRESS NOTES
Subjective:       Patient ID: Ranjith Peguero is a 42 y.o. male.    Chief Complaint: No chief complaint on file.    HPI Mr Peguero is here for his annual exam. He feels well today.  He reports medication compliance, he eats well and stays active.  He is due for basic labs.  Review of Systems   Constitutional: Negative for fever.   Respiratory: Negative.    Cardiovascular: Negative.        Objective:      Physical Exam   Constitutional: He is oriented to person, place, and time. He appears well-developed and well-nourished. He does not appear ill. No distress.   HENT:   Head: Normocephalic and atraumatic.   Cardiovascular: Normal rate, regular rhythm and normal heart sounds.  Exam reveals no friction rub.    No murmur heard.  Pulses:       Dorsalis pedis pulses are 2+ on the right side, and 2+ on the left side.        Posterior tibial pulses are 2+ on the right side, and 2+ on the left side.   Pulmonary/Chest: Effort normal and breath sounds normal. No respiratory distress. He has no decreased breath sounds. He has no wheezes. He has no rhonchi. He has no rales.   Musculoskeletal: Normal range of motion.   Neurological: He is alert and oriented to person, place, and time.   Skin: Skin is warm and dry.   Vitals reviewed.      Assessment:       1. Annual physical exam    2. Essential hypertension        Plan:       Annual physical exam  F/u in 6-12 months pending lab work    Essential hypertension  -     Basic metabolic panel; Future; Expected date: 03/16/2018  -     CBC auto differential; Future; Expected date: 03/16/2018  -     Lipid panel; Future; Expected date: 03/16/2018  -     amlodipine-benazepril 5-20 mg (LOTREL) 5-20 mg per capsule; Take 1 capsule by mouth once daily.  Dispense: 90 capsule; Refill: 1  Controlled, continue current treatment.

## 2018-03-16 NOTE — PATIENT INSTRUCTIONS
Follow up with primary care provider if not improved  Go to ER for new, worse or concerning symptoms    4 Steps for Eating Healthier  Changing the way you eat can improve your health. It can lower your cholesterol and blood pressure, and help you stay at a healthy weight. Your diet doesnt have to be bland and boring to be healthy. Just watch your calories and follow these steps:    1. Eat fewer unhealthy fats  · Choose more fish and lean meats instead of fatty cuts of meat.  · Skip butter and lard, and use less margarine.  · Pass on foods that have palm, coconut, or hydrogenated oils.  · Eat fewer high-fat dairy foods like cheese, ice cream, and whole milk.  · Get a heart-healthy cookbook and try some low-fat recipes.  2. Go light on salt  · Keep the saltshaker off the table.  · Limit high-salt ingredients, such as soy sauce, bouillon, and garlic salt.  · Instead of adding salt when cooking, season your food with herbs and flavorings. Try lemon, garlic, and onion.  · Limit convenience foods, such as boxed or canned foods and restaurant food.  · Read food labels and choose lower-sodium options.  3. Limit sugar  · Pause before you add sugars to pancakes, cereal, coffee, or tea. This includes white and brown table sugar, syrup, honey, and molasses. Cut your usual amount by half.  · Use non-sugar sweeteners. Stevia, aspartame, and sucralose can satisfy a sweet tooth without adding calories.  · Swap out sugar-filled soda and other drinks. Buy sugar-free or low-calorie beverages. Remember water is always the best choice.  · Read labels and choose foods with less added sugar. Keep in mind that dairy foods and foods with fruit will have some natural sugar.  · Cut the sugar in recipes by 1/3 to 1/2. Boost the flavor with extracts like almond, vanilla, or orange. Or add spices such as cinnamon or nutmeg.  4. Eat more fiber  · Eat fresh fruits and vegetables every day.  · Boost your diet with whole grains. Go for oats,  whole-grain rice, and bran.  · Add beans and lentils to your meals.  · Drink more water to match your fiber increase. This is to help prevent constipation.  Date Last Reviewed: 5/11/2015  © 6803-7678 [employer]. [address] [place] [address], [place] [address]. All rights reserved. This information is not intended as a substitute for professional medical care. Always follow your healthcare professional's instructions.

## 2018-03-22 ENCOUNTER — TELEPHONE (OUTPATIENT)
Dept: FAMILY MEDICINE | Facility: CLINIC | Age: 43
End: 2018-03-22

## 2018-03-22 ENCOUNTER — LAB VISIT (OUTPATIENT)
Dept: LAB | Facility: HOSPITAL | Age: 43
End: 2018-03-22
Payer: COMMERCIAL

## 2018-03-22 DIAGNOSIS — I10 ESSENTIAL HYPERTENSION: ICD-10-CM

## 2018-03-22 DIAGNOSIS — E78.5 DYSLIPIDEMIA: Primary | ICD-10-CM

## 2018-03-22 LAB
ANION GAP SERPL CALC-SCNC: 7 MMOL/L
BASOPHILS # BLD AUTO: 0.02 K/UL
BASOPHILS NFR BLD: 0.5 %
BUN SERPL-MCNC: 16 MG/DL
CALCIUM SERPL-MCNC: 9.7 MG/DL
CHLORIDE SERPL-SCNC: 103 MMOL/L
CHOLEST SERPL-MCNC: 276 MG/DL
CHOLEST/HDLC SERPL: 5.2 {RATIO}
CO2 SERPL-SCNC: 28 MMOL/L
CREAT SERPL-MCNC: 0.9 MG/DL
DIFFERENTIAL METHOD: NORMAL
EOSINOPHIL # BLD AUTO: 0.2 K/UL
EOSINOPHIL NFR BLD: 4.4 %
ERYTHROCYTE [DISTWIDTH] IN BLOOD BY AUTOMATED COUNT: 13.4 %
EST. GFR  (AFRICAN AMERICAN): >60 ML/MIN/1.73 M^2
EST. GFR  (NON AFRICAN AMERICAN): >60 ML/MIN/1.73 M^2
GLUCOSE SERPL-MCNC: 90 MG/DL
HCT VFR BLD AUTO: 42 %
HDLC SERPL-MCNC: 53 MG/DL
HDLC SERPL: 19.2 %
HGB BLD-MCNC: 14.6 G/DL
LDLC SERPL CALC-MCNC: 182.6 MG/DL
LYMPHOCYTES # BLD AUTO: 1.6 K/UL
LYMPHOCYTES NFR BLD: 37.6 %
MCH RBC QN AUTO: 30.7 PG
MCHC RBC AUTO-ENTMCNC: 34.8 G/DL
MCV RBC AUTO: 88 FL
MONOCYTES # BLD AUTO: 0.6 K/UL
MONOCYTES NFR BLD: 12.9 %
NEUTROPHILS # BLD AUTO: 1.9 K/UL
NEUTROPHILS NFR BLD: 44.6 %
NONHDLC SERPL-MCNC: 223 MG/DL
PLATELET # BLD AUTO: 218 K/UL
PMV BLD AUTO: 11.6 FL
POTASSIUM SERPL-SCNC: 4.5 MMOL/L
RBC # BLD AUTO: 4.76 M/UL
SODIUM SERPL-SCNC: 138 MMOL/L
TRIGL SERPL-MCNC: 202 MG/DL
WBC # BLD AUTO: 4.28 K/UL

## 2018-03-22 PROCEDURE — 85025 COMPLETE CBC W/AUTO DIFF WBC: CPT

## 2018-03-22 PROCEDURE — 80048 BASIC METABOLIC PNL TOTAL CA: CPT

## 2018-03-22 PROCEDURE — 36415 COLL VENOUS BLD VENIPUNCTURE: CPT | Mod: PN

## 2018-03-22 PROCEDURE — 80061 LIPID PANEL: CPT

## 2018-03-22 RX ORDER — ATORVASTATIN CALCIUM 10 MG/1
10 TABLET, FILM COATED ORAL DAILY
Qty: 90 TABLET | Refills: 0 | Status: SHIPPED | OUTPATIENT
Start: 2018-03-22 | End: 2018-06-25 | Stop reason: SDUPTHER

## 2018-03-22 NOTE — TELEPHONE ENCOUNTER
Please let him know his cholesterol is too high.  I want to start him on a medication for this.  He should take it once daily and f/u in 3 months for recheck.  I will mail all labs out

## 2018-03-23 NOTE — TELEPHONE ENCOUNTER
Patient notified of new medication and charted test results. Patient said that he will call back to schedule appointment.

## 2018-05-31 ENCOUNTER — OFFICE VISIT (OUTPATIENT)
Dept: FAMILY MEDICINE | Facility: CLINIC | Age: 43
End: 2018-05-31
Payer: COMMERCIAL

## 2018-05-31 VITALS
HEIGHT: 71 IN | BODY MASS INDEX: 30.63 KG/M2 | DIASTOLIC BLOOD PRESSURE: 80 MMHG | OXYGEN SATURATION: 96 % | HEART RATE: 91 BPM | SYSTOLIC BLOOD PRESSURE: 126 MMHG | TEMPERATURE: 98 F | WEIGHT: 218.81 LBS

## 2018-05-31 DIAGNOSIS — L50.9 URTICARIAL RASH: Primary | ICD-10-CM

## 2018-05-31 PROCEDURE — 99213 OFFICE O/P EST LOW 20 MIN: CPT | Mod: 25,S$GLB,, | Performed by: FAMILY MEDICINE

## 2018-05-31 PROCEDURE — 3074F SYST BP LT 130 MM HG: CPT | Mod: CPTII,S$GLB,, | Performed by: FAMILY MEDICINE

## 2018-05-31 PROCEDURE — 96372 THER/PROPH/DIAG INJ SC/IM: CPT | Mod: S$GLB,,, | Performed by: FAMILY MEDICINE

## 2018-05-31 PROCEDURE — 3008F BODY MASS INDEX DOCD: CPT | Mod: CPTII,S$GLB,, | Performed by: FAMILY MEDICINE

## 2018-05-31 PROCEDURE — 99999 PR PBB SHADOW E&M-EST. PATIENT-LVL IV: CPT | Mod: PBBFAC,,, | Performed by: NURSE PRACTITIONER

## 2018-05-31 PROCEDURE — 3079F DIAST BP 80-89 MM HG: CPT | Mod: CPTII,S$GLB,, | Performed by: FAMILY MEDICINE

## 2018-05-31 RX ORDER — HYDROXYZINE HYDROCHLORIDE 25 MG/1
25 TABLET, FILM COATED ORAL EVERY 12 HOURS PRN
Qty: 20 TABLET | Refills: 0 | Status: SHIPPED | OUTPATIENT
Start: 2018-05-31 | End: 2019-10-03

## 2018-05-31 RX ORDER — TRIAMCINOLONE ACETONIDE 1 MG/G
OINTMENT TOPICAL 2 TIMES DAILY
Qty: 80 G | Refills: 2 | Status: SHIPPED | OUTPATIENT
Start: 2018-05-31 | End: 2018-07-13

## 2018-05-31 RX ORDER — DEXAMETHASONE SODIUM PHOSPHATE 4 MG/ML
8 INJECTION, SOLUTION INTRA-ARTICULAR; INTRALESIONAL; INTRAMUSCULAR; INTRAVENOUS; SOFT TISSUE
Status: COMPLETED | OUTPATIENT
Start: 2018-05-31 | End: 2018-05-31

## 2018-05-31 RX ADMIN — DEXAMETHASONE SODIUM PHOSPHATE 8 MG: 4 INJECTION, SOLUTION INTRA-ARTICULAR; INTRALESIONAL; INTRAMUSCULAR; INTRAVENOUS; SOFT TISSUE at 08:05

## 2018-05-31 NOTE — PATIENT INSTRUCTIONS
Nonspecific Dermatitis  Dermatitis is a skin rash caused by something that touches the skin and makes it irritated and inflamed.  Your skin may be red, swollen, dry, and may be cracked. Blisters may form and ooze. The rash will itch.  Dermatitis can form on the face and neck, backs of hands, forearms, genitals, and lower legs. Dermatitis is not passed from person to person.  Talk with your health care provider about what may have caused the rash. Common things that cause skin allergies are metal in jewelry, plants like poison ivy or poison oak, and certain skin care products. You will need to avoid the source of your rash in the future to prevent it from coming back. In some cases, the cause of the dermatitis may not be found.  Treatment is done to relieve itching and prevent the rash from coming back. The rash should go away in a few days to a few weeks.  Home care  The health care provider may prescribe medications to relieve swelling and itching. Follow all instructions when using these medications.  · Avoid anything that heats up your skin, such as hot showers or baths, or direct sunlight. This can make itching worse.  · Stay away from whatever you think caused the rash.  · Bathe in warm, not hot, water. Apply a moisturizing lotion after bathing to prevent dry skin.  · Avoid skin irritants such as wool or silk clothing, grease, oils, harsh soaps, and detergents.  · Apply cold compresses to soothe your sores to help relieve your symptoms. Do this for 30 minutes 3 to 4 times a day. You can make a cold compress by soaking a cloth in cold water. Squeeze out excess water. You can add colloidal oatmeal to the water to help reduce itching. For severe itching in a small area, apply an ice pack wrapped in a thin towel. Do this for 20 minutes 3 to 4 times a day.  · You can also help relieve large areas of itching by taking a lukewarm bath with colloidal oatmeal added to the water.  · Use hydrocortisone cream for redness  and irritation, unless another medicine was prescribed. You can also use benzocaine anesthetic cream or spray.  · Use oral diphenhydramine to help reduce itching. This is an antihistamine you can buy at drug and grocery stores. It can make you sleepy, so use lower doses during the daytime. Or you can use loratadine. This is an antihistamine that will not make you sleepy. Dont use diphenhydramine if you have glaucoma or have trouble urinating because of an enlarged prostate.  · Wash your hands or use an antibacterial gel often to prevent the spread of the rash.  Follow-up care  Follow up with your health care provider. Make an appointment with your health care provider if your symptoms do not get better in the next 1 to 2 weeks.  When to seek medical advice  Call your health care provider right away if any of these occur:  · Spreading of the rash to other parts of your body  · Severe swelling of your face, eyelids, mouth, throat or tongue  · Trouble urinating due to swelling in the genital area  · Fever of 100.4°F (38°C) or higher  · Redness or swelling that gets worse  · Pain that gets worse  · Foul-smelling fluid leaking from the skin  · Yellow-brown crusts on the open blisters  · Joint pain   Date Last Reviewed: 7/23/2014  © 7127-8913 [employer] [address] [place] [address], [place] [address]. All rights reserved. This information is not intended as a substitute for professional medical care. Always follow your healthcare professional's instructions.        Self-Care for Skin Rashes     Pat your skin dry. Do not rub.     When your skin reacts to a substance your body is sensitive to, it can cause a rash. You can treat most rashes at home by keeping the skin clean and dry. Many rashes may get better on their own within 2 to 3 days. You may need medical attention if your rash itches, drains, or hurts, particularly if the rash is getting worse.  What can cause a skin rash?  · Sun poisoning, caused by too  much exposure to the sun  · An irritant or allergic reaction to a certain type of food, plant, or chemical, such as  shellfish, poison ivy, and or cleaning products  · An infection caused by a fungus (ringworm), virus (chickenpox), or bacteria (strep)  · Bites or infestation caused by insects or pests, such as ticks, lice, or mites  · Dry skin, which is often seen during the winter months and in older people  How can I control itching and skin damage?  · Take soothing lukewarm baths in a colloidal oatmeal product. You can buy this at the [employer].  · Do your best not to scratch. Clip fingernails short, especially in young children, to reduce skin damage if scratching does occur.  · Use moisturizing skin lotion instead of scratching your dry skin.  · Use sunscreen whenever going out into direct sun.  · Use only mild cleansing agents whenever possible.  · Wash with mild, nonirritating soap and warm water.  · Wear clothing that breathes, such as cotton shirts or canvas shoes.  · If fluid is seeping from the rash, cover it loosely with clean gauze to absorb the discharge.  · Many rashes are contagious. Prevent the rash from spreading to others by washing your hands often before or after touching others with any skin rash.  Use medicine  · Antihistamines such as diphenhydramine can help control itching. But use with caution because they can make you drowsy.  · Using over-the-counter hydrocortisone cream on small rashes may help reduce swelling and itching  · Most over-the-counter antifungal medicines can treat athletes foot and many other fungal infections of the skin.  Check with your healthcare provider  Call your healthcare provider if:  · You were told that you have a fungal infection on your skin to make sure you have the correct type of medicine.  · You have questions or concerns about medicines or their side effects.     Call 911  Call 911 if either of these occur:  · Your tongue or lips start to swell  · You  have difficulty breathing      Call your healthcare provider  Call your healthcare provider if any of these occur:  · Temperature of more than 101.0°F (38.3°C), or as directed  · Sore throat, a cough, or unusual fatigue  · Red, oozy, or painful rash gets worse. These are signs of infection.  · Rash covers your face, genitals, or most of your body  · Crusty sores or red rings that begin to spread  · You were exposed to someone who has a contagious rash, such as scabies or lice.  · Red bulls-eye rash with a white center (a sign of Lyme disease)  · You were told that you have resistant bacteria (MRSA) on your skin.   Date Last Reviewed: 5/12/2015  © 5926-9379 [employer] [address] [place] [address], [place] [address]. All rights reserved. This information is not intended as a substitute for professional medical care. Always follow your healthcare professional's instructions.

## 2018-05-31 NOTE — PROGRESS NOTES
"Subjective:       Patient ID: Ranjith Peguero is a 42 y.o. male.    Chief Complaint: Rash (patient states itchy rash on arms,he gets every summer.)    Rash   This is a recurrent problem. The current episode started in the past 7 days. The problem has been gradually worsening since onset. The affected locations include the left arm and right arm. The rash is characterized by itchiness. He was exposed to nothing. Past treatments include antihistamine (calamine lotion). The treatment provided no relief.       Past Medical History:   Diagnosis Date    Hypertension        Social History     Social History    Marital status:      Spouse name: N/A    Number of children: N/A    Years of education: N/A     Occupational History    Not on file.     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  .  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         History reviewed. No pertinent surgical history.    Review of Systems   Skin: Positive for rash.       Objective:   /80 (BP Location: Right arm, Patient Position: Sitting, BP Method: Large (Manual))   Pulse 91   Temp 98 °F (36.7 °C) (Oral)   Ht 5' 11" (1.803 m)   Wt 99.2 kg (218 lb 12.9 oz)   SpO2 96%   BMI 30.52 kg/m²      Physical Exam   Skin: Rash noted. Rash is urticarial. He is not diaphoretic. No pallor.            Assessment:       1. Urticarial rash        Plan:       Ranjith was seen today for rash.    Diagnoses and all orders for this visit:    Urticarial rash  -     dexamethasone injection 8 mg; Inject 2 mLs (8 mg total) into the muscle one time.  -     hydrOXYzine HCl (ATARAX) 25 MG tablet; Take 1 tablet (25 mg total) by mouth every 12 (twelve) hours as needed for Itching.  -     triamcinolone acetonide 0.1% (KENALOG) 0.1 % ointment; Apply " topically 2 (two) times daily.  The health care provider may prescribe medications to relieve swelling and itching. Follow all instructions when using these medications.  · Avoid anything that heats up your skin, such as hot showers or baths, or direct sunlight. This can make itching worse.  · Stay away from whatever you think caused the rash.  · Bathe in warm, not hot, water. Apply a moisturizing lotion after bathing to prevent dry skin.  · Avoid skin irritants such as wool or silk clothing, grease, oils, harsh soaps, and detergents.  · Apply cold compresses to soothe your sores to help relieve your symptoms. Do this for 30 minutes 3 to 4 times a day. You can make a cold compress by soaking a cloth in cold water. Squeeze out excess water. You can add colloidal oatmeal to the water to help reduce itching. For severe itching in a small area, apply an ice pack wrapped in a thin towel. Do this for 20 minutes 3 to 4 times a day.  · You can also help relieve large areas of itching by taking a lukewarm bath with colloidal oatmeal added to the water.  · Use hydrocortisone cream for redness and irritation, unless another medicine was prescribed. You can also use benzocaine anesthetic cream or spray.  · Use oral diphenhydramine to help reduce itching. This is an antihistamine you can buy at drug and grocery stores. It can make you sleepy, so use lower doses during the daytime. Or you can use loratadine. This is an antihistamine that will not make you sleepy. Dont use diphenhydramine if you have glaucoma or have trouble urinating because of an enlarged prostate.  · Wash your hands or use an antibacterial gel often to prevent the spread of the rash.        Follow-up if symptoms worsen or fail to improve.

## 2018-06-25 DIAGNOSIS — E78.5 DYSLIPIDEMIA: ICD-10-CM

## 2018-06-26 RX ORDER — ATORVASTATIN CALCIUM 10 MG/1
10 TABLET, FILM COATED ORAL DAILY
Qty: 90 TABLET | Refills: 0 | Status: SHIPPED | OUTPATIENT
Start: 2018-06-26 | End: 2018-09-21 | Stop reason: SDUPTHER

## 2018-07-13 ENCOUNTER — TELEPHONE (OUTPATIENT)
Dept: FAMILY MEDICINE | Facility: CLINIC | Age: 43
End: 2018-07-13

## 2018-07-13 ENCOUNTER — HOSPITAL ENCOUNTER (OUTPATIENT)
Dept: RADIOLOGY | Facility: HOSPITAL | Age: 43
Discharge: HOME | End: 2018-07-13
Attending: NURSE PRACTITIONER
Payer: COMMERCIAL

## 2018-07-13 ENCOUNTER — OFFICE VISIT (OUTPATIENT)
Dept: FAMILY MEDICINE | Facility: CLINIC | Age: 43
End: 2018-07-13
Payer: COMMERCIAL

## 2018-07-13 VITALS
HEART RATE: 76 BPM | WEIGHT: 218.81 LBS | BODY MASS INDEX: 30.63 KG/M2 | HEIGHT: 71 IN | TEMPERATURE: 98 F | OXYGEN SATURATION: 97 % | DIASTOLIC BLOOD PRESSURE: 86 MMHG | SYSTOLIC BLOOD PRESSURE: 118 MMHG

## 2018-07-13 DIAGNOSIS — R10.9 ACUTE RIGHT FLANK PAIN: ICD-10-CM

## 2018-07-13 DIAGNOSIS — R21 RASH/SKIN ERUPTION: ICD-10-CM

## 2018-07-13 DIAGNOSIS — R10.84 GENERALIZED ABDOMINAL PAIN: ICD-10-CM

## 2018-07-13 DIAGNOSIS — R10.11 RIGHT UPPER QUADRANT ABDOMINAL PAIN: ICD-10-CM

## 2018-07-13 DIAGNOSIS — R10.9 ACUTE RIGHT FLANK PAIN: Primary | ICD-10-CM

## 2018-07-13 DIAGNOSIS — N30.00 ACUTE CYSTITIS WITHOUT HEMATURIA: Primary | ICD-10-CM

## 2018-07-13 PROCEDURE — 3079F DIAST BP 80-89 MM HG: CPT | Mod: CPTII,S$GLB,, | Performed by: NURSE PRACTITIONER

## 2018-07-13 PROCEDURE — 3008F BODY MASS INDEX DOCD: CPT | Mod: CPTII,S$GLB,, | Performed by: NURSE PRACTITIONER

## 2018-07-13 PROCEDURE — 74177 CT ABD & PELVIS W/CONTRAST: CPT | Mod: TC

## 2018-07-13 PROCEDURE — 25500020 PHARM REV CODE 255: Performed by: NURSE PRACTITIONER

## 2018-07-13 PROCEDURE — 99999 PR PBB SHADOW E&M-EST. PATIENT-LVL V: CPT | Mod: PBBFAC,,, | Performed by: NURSE PRACTITIONER

## 2018-07-13 PROCEDURE — 99214 OFFICE O/P EST MOD 30 MIN: CPT | Mod: S$GLB,,, | Performed by: NURSE PRACTITIONER

## 2018-07-13 PROCEDURE — 74177 CT ABD & PELVIS W/CONTRAST: CPT | Mod: 26,,, | Performed by: RADIOLOGY

## 2018-07-13 PROCEDURE — 3074F SYST BP LT 130 MM HG: CPT | Mod: CPTII,S$GLB,, | Performed by: NURSE PRACTITIONER

## 2018-07-13 RX ADMIN — IOHEXOL 15 ML: 300 INJECTION, SOLUTION INTRAVENOUS at 02:07

## 2018-07-13 RX ADMIN — IOHEXOL 100 ML: 350 INJECTION, SOLUTION INTRAVENOUS at 02:07

## 2018-07-13 NOTE — PROGRESS NOTES
"Subjective:       Patient ID: Ranjith Peguero is a 42 y.o. male.    Chief Complaint: Mole (bilateral armpit moles ) and Flank Pain (side and back pain X 5 days )    Mole   This is a new problem. The current episode started 1 to 4 weeks ago (a couple of weeks). The problem occurs constantly. The problem has been gradually worsening. Associated symptoms include abdominal pain. Exacerbated by: deodorant burns when first applied. He has tried nothing for the symptoms.   Flank Pain   This is a recurrent problem. The current episode started 1 to 4 weeks ago. The problem occurs constantly. The problem has been gradually worsening since onset. The quality of the pain is described as aching. Radiates to: right side. The pain is at a severity of 6/10. The pain is moderate. Associated symptoms include abdominal pain. Pertinent negatives include no bladder incontinence, bowel incontinence or dysuria. He has tried nothing for the symptoms.       Past Medical History:   Diagnosis Date    Hypertension        Social History     Social History    Marital status:      Spouse name: N/A    Number of children: N/A    Years of education: N/A     Occupational History    Not on file.     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  .  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         History reviewed. No pertinent surgical history.    Review of Systems   Gastrointestinal: Positive for abdominal pain. Negative for bowel incontinence.   Genitourinary: Positive for flank pain. Negative for bladder incontinence and dysuria.       Objective:   /86 (BP Location: Right arm, Patient Position: Sitting, BP Method: Large (Manual))   Pulse 76   Temp 97.8 °F (36.6 °C) (Oral)   Ht 5' 11" (1.803 m)   Wt 99.2 kg (218 lb 12.9 " oz)   SpO2 97%   BMI 30.52 kg/m²      Physical Exam   Constitutional: He is oriented to person, place, and time. He appears well-developed and well-nourished.   HENT:   Head: Normocephalic and atraumatic.   Cardiovascular: Normal rate, regular rhythm and normal heart sounds.    Pulmonary/Chest: Effort normal and breath sounds normal. No respiratory distress. He has no decreased breath sounds. He has no wheezes. He has no rhonchi. He has no rales.   Abdominal: Soft. Bowel sounds are normal. He exhibits no distension and no mass. There is generalized tenderness. There is no rigidity, no guarding and no CVA tenderness.       Neurological: He is alert and oriented to person, place, and time.   Psychiatric: He has a normal mood and affect. His speech is normal and behavior is normal.               Assessment:       1. Acute right flank pain    2. Right upper quadrant abdominal pain    3. Rash/skin eruption    4. Generalized abdominal pain        Plan:       Ranjith was seen today for mole and flank pain.    Diagnoses and all orders for this visit:    Acute right flank pain  -     CT Abdomen Pelvis With Contrast; Future    Right upper quadrant abdominal pain  -     CT Abdomen Pelvis With Contrast; Future  -     Creatinine, serum; Future    Rash/skin eruption  -     Ambulatory referral to Dermatology  -     Possibly burns from deodorant    Generalized abdominal pain    Will follow up when results available.   Follow-up if symptoms worsen or fail to improve.

## 2018-07-13 NOTE — TELEPHONE ENCOUNTER
----- Message from Elsy Rowell sent at 7/13/2018  3:39 PM CDT -----  Contact: Self  Pt is calling to speak with staff to find out test results. Please call pt at [phone].

## 2018-07-13 NOTE — LETTER
July 13, 2018      [facility]  [address] [facility]  [place] [address]  Phone: [phone]  Fax: [phone]       Patient: Ranjith Peguero   YOB: 1975  Date of Visit: 07/13/2018    To Whom It May Concern:    Desiree Peguero  was at [facility] on 07/13/2018. He may return to work/school on 07/15/2018 with no restrictions. If you have any questions or concerns, or if I can be of further assistance, please do not hesitate to contact me.    Sincerely,      KRISSY Becerra

## 2018-07-16 ENCOUNTER — TELEPHONE (OUTPATIENT)
Dept: FAMILY MEDICINE | Facility: CLINIC | Age: 43
End: 2018-07-16

## 2018-07-16 NOTE — TELEPHONE ENCOUNTER
Spoke with patient in reference to results. He verbalized understanding. 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

## 2018-07-16 NOTE — TELEPHONE ENCOUNTER
Patient will be contacting Dr. Mel Bonilla [facility] to schedule Dermatology appt.

## 2018-07-16 NOTE — TELEPHONE ENCOUNTER
----- Message from Arlene Coy sent at 7/16/2018  1:06 PM CDT -----  Contact: TITI [phone]  Pt is requesting a call back in regards to his urinalysis. He also is experiencing back pain as well. Please call at your earliest convenience.

## 2018-09-21 DIAGNOSIS — E78.5 DYSLIPIDEMIA: ICD-10-CM

## 2018-09-21 RX ORDER — ATORVASTATIN CALCIUM 10 MG/1
TABLET, FILM COATED ORAL
Qty: 90 TABLET | Refills: 0 | Status: SHIPPED | OUTPATIENT
Start: 2018-09-21 | End: 2018-12-27 | Stop reason: SDUPTHER

## 2018-09-21 RX ORDER — TRIAMCINOLONE ACETONIDE 1 MG/G
CREAM TOPICAL
Refills: 1 | COMMUNITY
Start: 2018-07-30 | End: 2019-10-03

## 2018-09-25 ENCOUNTER — TELEPHONE (OUTPATIENT)
Dept: FAMILY MEDICINE | Facility: CLINIC | Age: 43
End: 2018-09-25

## 2018-09-25 NOTE — TELEPHONE ENCOUNTER
----- Message from Baldemar Carpenter sent at 9/25/2018  1:45 PM CDT -----  Contact: Self/[phone]  Refill:  amlodipine-benazepril 5-20 mg (LOTREL) 5-20 mg per capsule    Patient stated that he's completely out of this medication. Thank you.    .  [facility] [address] [place] [address] [place] [address] [address] [facility] [place] [address] [address]  [place] [address]  Phone: [phone] Fax: [phone]

## 2018-09-26 ENCOUNTER — LAB VISIT (OUTPATIENT)
Dept: LAB | Facility: HOSPITAL | Age: 43
End: 2018-09-26
Attending: FAMILY MEDICINE
Payer: COMMERCIAL

## 2018-09-26 ENCOUNTER — OFFICE VISIT (OUTPATIENT)
Dept: FAMILY MEDICINE | Facility: CLINIC | Age: 43
End: 2018-09-26
Payer: COMMERCIAL

## 2018-09-26 VITALS
WEIGHT: 221.31 LBS | DIASTOLIC BLOOD PRESSURE: 98 MMHG | BODY MASS INDEX: 30.98 KG/M2 | RESPIRATION RATE: 12 BRPM | HEIGHT: 71 IN | TEMPERATURE: 98 F | SYSTOLIC BLOOD PRESSURE: 146 MMHG | OXYGEN SATURATION: 97 % | HEART RATE: 78 BPM

## 2018-09-26 DIAGNOSIS — M10.9 GOUT, UNSPECIFIED CAUSE, UNSPECIFIED CHRONICITY, UNSPECIFIED SITE: ICD-10-CM

## 2018-09-26 DIAGNOSIS — I10 ESSENTIAL HYPERTENSION: Primary | ICD-10-CM

## 2018-09-26 DIAGNOSIS — Z23 IMMUNIZATION DUE: ICD-10-CM

## 2018-09-26 DIAGNOSIS — I10 ESSENTIAL HYPERTENSION: ICD-10-CM

## 2018-09-26 LAB
ALBUMIN SERPL BCP-MCNC: 4.5 G/DL
ALP SERPL-CCNC: 68 U/L
ALT SERPL W/O P-5'-P-CCNC: 24 U/L
ANION GAP SERPL CALC-SCNC: 8 MMOL/L
AST SERPL-CCNC: 22 U/L
BILIRUB SERPL-MCNC: 0.9 MG/DL
BUN SERPL-MCNC: 15 MG/DL
CALCIUM SERPL-MCNC: 10 MG/DL
CHLORIDE SERPL-SCNC: 104 MMOL/L
CO2 SERPL-SCNC: 27 MMOL/L
CREAT SERPL-MCNC: 1 MG/DL
EST. GFR  (AFRICAN AMERICAN): >60 ML/MIN/1.73 M^2
EST. GFR  (NON AFRICAN AMERICAN): >60 ML/MIN/1.73 M^2
GLUCOSE SERPL-MCNC: 99 MG/DL
POTASSIUM SERPL-SCNC: 4.6 MMOL/L
PROT SERPL-MCNC: 7.9 G/DL
SODIUM SERPL-SCNC: 139 MMOL/L
URATE SERPL-MCNC: 7.2 MG/DL

## 2018-09-26 PROCEDURE — 80053 COMPREHEN METABOLIC PANEL: CPT

## 2018-09-26 PROCEDURE — 99999 PR PBB SHADOW E&M-EST. PATIENT-LVL III: CPT | Mod: PBBFAC,,, | Performed by: FAMILY MEDICINE

## 2018-09-26 PROCEDURE — 99214 OFFICE O/P EST MOD 30 MIN: CPT | Mod: 25,S$GLB,, | Performed by: FAMILY MEDICINE

## 2018-09-26 PROCEDURE — 3077F SYST BP >= 140 MM HG: CPT | Mod: CPTII,S$GLB,, | Performed by: FAMILY MEDICINE

## 2018-09-26 PROCEDURE — 3008F BODY MASS INDEX DOCD: CPT | Mod: CPTII,S$GLB,, | Performed by: FAMILY MEDICINE

## 2018-09-26 PROCEDURE — 90686 IIV4 VACC NO PRSV 0.5 ML IM: CPT | Mod: S$GLB,,, | Performed by: FAMILY MEDICINE

## 2018-09-26 PROCEDURE — 90471 IMMUNIZATION ADMIN: CPT | Mod: S$GLB,,, | Performed by: FAMILY MEDICINE

## 2018-09-26 PROCEDURE — 84550 ASSAY OF BLOOD/URIC ACID: CPT

## 2018-09-26 PROCEDURE — 36415 COLL VENOUS BLD VENIPUNCTURE: CPT | Mod: PN

## 2018-09-26 PROCEDURE — 3080F DIAST BP >= 90 MM HG: CPT | Mod: CPTII,S$GLB,, | Performed by: FAMILY MEDICINE

## 2018-09-26 RX ORDER — COLCHICINE 0.6 MG/1
TABLET ORAL
Qty: 30 TABLET | Refills: 0 | Status: SHIPPED | OUTPATIENT
Start: 2018-09-26 | End: 2019-01-02 | Stop reason: SDUPTHER

## 2018-09-26 RX ORDER — AMLODIPINE AND BENAZEPRIL HYDROCHLORIDE 5; 20 MG/1; MG/1
1 CAPSULE ORAL DAILY
Qty: 90 CAPSULE | Refills: 1 | Status: SHIPPED | OUTPATIENT
Start: 2018-09-26 | End: 2019-04-04 | Stop reason: SDUPTHER

## 2018-09-26 NOTE — PROGRESS NOTES
Routine Office Visit    Patient Name: Ranjith Peguero    : 1975  MRN: [id number]    Subjective:  Ranjith is a 42 y.o. male who presents today for:    1. Medication refill  Patient presenting today for refill of his gout and blood pressure medication.  He ran out of blood pressure medication yesterday.  He has been tolerating all medications well. He denies any chest pain, shortness of breath, numbness, tingling, or weakness.  He did have a gout flare in the left great toe 2 weeks ago, but was easily treated with colchicine.  He states that he is not sure he has gout as all of the tests he has had done have been negative.  He mostly gets pain in his left great toe and right elbow.      Past Medical History  Past Medical History:   Diagnosis Date    Gout     L big toe    Hyperlipidemia     Hypertension        Past Surgical History  History reviewed. No pertinent surgical history.    Family History  Family History   Problem Relation Age of Onset    Hypertension Mother     Hypertension Father        Social History  Social History     Socioeconomic History    Marital status:      Spouse name: Not on file    Number of children: Not on file    Years of education: Not on file    Highest education level: Not on file   Social Needs    Financial resource strain: Not on file    Food insecurity - worry: Not on file    Food insecurity - inability: Not on file    Transportation needs - medical: Not on file    Transportation needs - non-medical: Not on file   Occupational History    Not on file   Tobacco Use    Smoking status: Never Smoker    Smokeless tobacco: Never Used   Substance and Sexual Activity    Alcohol use: Yes    Drug use: No    Sexual activity: Yes     Partners: Female   Other Topics Concern    Not on file   Social History Narrative    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         Current Medications  Current Outpatient Medications on File Prior to Visit   Medication Sig  "Dispense Refill    atorvastatin (LIPITOR) 10 MG tablet TAKE 1 TABLET BY MOUTH ONCE DAILY 90 tablet 0    FLUVIRIN 8152-7298 45 mcg (15 mcg x 3)/0.5 mL Susp       [DISCONTINUED] amlodipine-benazepril 5-20 mg (LOTREL) 5-20 mg per capsule Take 1 capsule by mouth once daily. 90 capsule 1    [DISCONTINUED] colchicine 0.6 mg tablet 1.2 mg at the first sign of flare, followed in 1 hour with a single dose of 0.6 mg (maximum: 1.8 mg within 1 hour). 30 tablet 0    hydrOXYzine HCl (ATARAX) 25 MG tablet Take 1 tablet (25 mg total) by mouth every 12 (twelve) hours as needed for Itching. 20 tablet 0    triamcinolone acetonide 0.1% (KENALOG) 0.1 % cream IRMA AA ON THE SKIN QD TO BID  1     No current facility-administered medications on file prior to visit.        Allergies   Review of patient's allergies indicates:  No Known Allergies    Review of Systems (Pertinent positives)  Review of Systems   Constitutional: Negative.    HENT: Negative.    Eyes: Negative.    Respiratory: Negative.    Cardiovascular: Negative.    Genitourinary: Negative.    Musculoskeletal: Positive for joint pain.   Skin: Negative.    Neurological: Negative.          BP (!) 146/98 (BP Location: Left arm, Patient Position: Sitting, BP Method: Medium (Manual))   Pulse 78   Temp 98.2 °F (36.8 °C) (Oral)   Resp 12   Ht 5' 11" (1.803 m)   Wt 100.4 kg (221 lb 5.5 oz)   SpO2 97%   BMI 30.87 kg/m²     GENERAL APPEARANCE: in no apparent distress and well developed and well nourished  HEENT: PERRL, EOMI, Sclera clear, anicteric, Oropharynx clear, no lesions, Neck supple with midline trachea  NECK: normal, supple, no adenopathy, thyroid normal in size  RESPIRATORY: appears well, vitals normal, no respiratory distress, acyanotic, normal RR, chest clear, no wheezing, crepitations, rhonchi, normal symmetric air entry  HEART: regular rate and rhythm, S1, S2 normal, no murmur, click, rub or gallop.    ABDOMEN: abdomen is soft without tenderness, no masses, no " hernias, no organomegaly, no rebound, no guarding. Suprapubic tenderness absent. No CVA tenderness.  SKIN: no rashes, no wounds, no other lesions  PSYCH: Alert, oriented x 3, thought content appropriate, speech normal, pleasant and cooperative, good eye contact, well groomed,    Assessment/Plan:  Ranjith Peguero is a 42 y.o. male who presents today for :    Ranjith was seen today for establish care and medication refill.    Diagnoses and all orders for this visit:    Essential hypertension  -     amlodipine-benazepril 5-20 mg (LOTREL) 5-20 mg per capsule; Take 1 capsule by mouth once daily.  -     Comprehensive metabolic panel; Future    Gout, unspecified cause, unspecified chronicity, unspecified site  -     colchicine (COLCRYS) 0.6 mg tablet; 1.2 mg at the first sign of flare, followed in 1 hour with a single dose of 0.6 mg (maximum: 1.8 mg within 1 hour).  -     Comprehensive metabolic panel; Future  -     Uric acid; Future    Immunization due  -     Influenza - Quadrivalent (3 years & older) (PF)      1.  Medications refilled  2.  Blood work to be done today  3.  Flu shot given  4.  Follow up 3 months or sooner if needed      Sukumar Corbin MD

## 2018-09-26 NOTE — MEDICAL/APP STUDENT
Subjective:       Patient ID: Ranjith Peguero is a 42 y.o. male.    Chief Complaint: Establish Care and Medication Refill    Hypertension   This is a new problem. The current episode started more than 1 year ago. The problem has been waxing and waning since onset. The problem is controlled. Pertinent negatives include no chest pain, headaches, palpitations or shortness of breath. There are no associated agents to hypertension. Risk factors for coronary artery disease include family history, male gender and stress. Past treatments include calcium channel blockers and ACE inhibitors. The current treatment provides significant improvement. There are no compliance problems.      Review of Systems   Constitutional: Negative for chills and fever.   HENT: Negative for congestion, ear discharge, ear pain, rhinorrhea, sore throat and voice change.    Eyes: Negative for discharge and itching.   Respiratory: Negative for choking, shortness of breath and wheezing.    Cardiovascular: Negative for chest pain, palpitations and leg swelling.   Gastrointestinal: Negative for abdominal distention, constipation, diarrhea, nausea and vomiting.   Endocrine: Negative for polydipsia, polyphagia and polyuria.   Genitourinary: Negative for dysuria, frequency, hematuria and urgency.   Musculoskeletal: Negative for arthralgias and myalgias.   Skin: Negative for color change, rash and wound.   Neurological: Negative for dizziness, weakness and headaches.   Hematological: Negative for adenopathy. Does not bruise/bleed easily.       Objective:      Physical Exam   Constitutional: He is oriented to person, place, and time. He appears well-developed and well-nourished. No distress.   HENT:   Head: Normocephalic and atraumatic.   Right Ear: External ear normal.   Left Ear: External ear normal.   Nose: Nose normal.   Eyes: Conjunctivae and EOM are normal. Pupils are equal, round, and reactive to light. Right eye exhibits no discharge. Left eye  exhibits no discharge. No scleral icterus.   Neck: Normal range of motion. Neck supple.   Cardiovascular: Normal rate, regular rhythm, S1 normal, S2 normal, normal heart sounds and intact distal pulses. Exam reveals no gallop and no friction rub.   No murmur heard.  Pulmonary/Chest: Effort normal and breath sounds normal. No stridor. No respiratory distress. He has no decreased breath sounds. He has no wheezes. He has no rhonchi. He has no rales.   Abdominal: He exhibits no distension.   Musculoskeletal: Normal range of motion. He exhibits no edema, tenderness or deformity.   Neurological: He is alert and oriented to person, place, and time. He displays normal reflexes. No cranial nerve deficit or sensory deficit. He exhibits normal muscle tone. Coordination normal.   Skin: Skin is warm and dry. Capillary refill takes less than 2 seconds. He is not diaphoretic.   Nursing note and vitals reviewed.      Assessment:       1. Essential hypertension    2. Gout, unspecified cause, unspecified chronicity, unspecified site        Plan:         Essential hypertension  -     amlodipine-benazepril 5-20 mg (LOTREL) 5-20 mg per capsule; Take 1 capsule by mouth once daily.  Dispense: 90 capsule; Refill: 1    Gout, unspecified cause, unspecified chronicity, unspecified site  -     colchicine (COLCRYS) 0.6 mg tablet; 1.2 mg at the first sign of flare, followed in 1 hour with a single dose of 0.6 mg (maximum: 1.8 mg within 1 hour).  Dispense: 30 tablet; Refill: 0    Other orders  -     Influenza - Quadrivalent (3 years & older) (PF)

## 2018-10-09 ENCOUNTER — TELEPHONE (OUTPATIENT)
Dept: FAMILY MEDICINE | Facility: CLINIC | Age: 43
End: 2018-10-09

## 2018-10-09 NOTE — TELEPHONE ENCOUNTER
----- Message from Arlene Coy sent at 10/9/2018 11:54 AM CDT -----  Contact: Ranjith [phone]  Patient is calling to check on the status of [place] paperwork. Please call at your earliest convenience.

## 2018-10-09 NOTE — TELEPHONE ENCOUNTER
LMOVM re: not having [place] paperwork.      Pt stated previously he would bring another copy if needed.     We will need another copy.

## 2018-11-02 ENCOUNTER — TELEPHONE (OUTPATIENT)
Dept: FAMILY MEDICINE | Facility: CLINIC | Age: 43
End: 2018-11-02

## 2018-11-02 ENCOUNTER — OFFICE VISIT (OUTPATIENT)
Dept: FAMILY MEDICINE | Facility: CLINIC | Age: 43
End: 2018-11-02
Payer: COMMERCIAL

## 2018-11-02 VITALS
TEMPERATURE: 98 F | DIASTOLIC BLOOD PRESSURE: 84 MMHG | HEART RATE: 77 BPM | OXYGEN SATURATION: 95 % | SYSTOLIC BLOOD PRESSURE: 124 MMHG | WEIGHT: 225.5 LBS | BODY MASS INDEX: 31.57 KG/M2 | RESPIRATION RATE: 12 BRPM | HEIGHT: 71 IN

## 2018-11-02 DIAGNOSIS — M1A.0720 CHRONIC IDIOPATHIC GOUT INVOLVING TOE OF LEFT FOOT WITHOUT TOPHUS: ICD-10-CM

## 2018-11-02 PROCEDURE — 99999 PR PBB SHADOW E&M-EST. PATIENT-LVL III: CPT | Mod: PBBFAC,,, | Performed by: FAMILY MEDICINE

## 2018-11-02 PROCEDURE — 3008F BODY MASS INDEX DOCD: CPT | Mod: CPTII,S$GLB,, | Performed by: FAMILY MEDICINE

## 2018-11-02 PROCEDURE — 3079F DIAST BP 80-89 MM HG: CPT | Mod: CPTII,S$GLB,, | Performed by: FAMILY MEDICINE

## 2018-11-02 PROCEDURE — 99213 OFFICE O/P EST LOW 20 MIN: CPT | Mod: S$GLB,,, | Performed by: FAMILY MEDICINE

## 2018-11-02 PROCEDURE — 3074F SYST BP LT 130 MM HG: CPT | Mod: CPTII,S$GLB,, | Performed by: FAMILY MEDICINE

## 2018-11-02 NOTE — TELEPHONE ENCOUNTER
Dr Corbin was notified of [place] paperwork needed, and pt has scheduled an appt to get paperwork completed this morning per Dr Corbin's orders

## 2018-11-02 NOTE — TELEPHONE ENCOUNTER
----- Message from Sanjana Ulloa sent at 11/1/2018  1:16 PM CDT -----  Contact: Pt's Wife - Vivi  Pt's wife dropped off FMLA paperwork for her . She states she was told to put it in Velma's (LPN) hands b/c it got lost last time. I let her know that Velma was in w/ a pt but that I would give the paperwork directly to Velma. Paperwork has been given directly to Velma 11/1/18.

## 2018-11-02 NOTE — LETTER
November 2, 2018    Ranjith Peguero  [address] [facility] [address]      [facility]  [address] [address]  Phone: [phone] Ranjith Peguero    Was treated here on 11/02/2018    May Return to work/school on 11/02/2018    No Restrictions        Sincerely,    Sukumar Corbin MD

## 2018-11-02 NOTE — PROGRESS NOTES
Routine Office Visit    Patient Name: Ranjith Peguero    : 1975  MRN: [id number]    Subjective:  Ranjith is a 43 y.o. male who presents today for:    1. Paperwork  Patient presenting today to have [place] paperwork filled out for when he has flares of his gout.  Previous paperwork was declined.  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  He has left toe pain at this time, but states he has colchicine at home that he will take to treat it.      Past Medical History  Past Medical History:   Diagnosis Date    Gout     L big toe    Hyperlipidemia     Hypertension        Past Surgical History  History reviewed. No pertinent surgical history.    Family History  Family History   Problem Relation Age of Onset    Hypertension Mother     Hypertension Father        Social History  Social History     Socioeconomic History    Marital status:      Spouse name: Not on file    Number of children: Not on file    Years of education: Not on file    Highest education level: Not on file   Social Needs    Financial resource strain: Not on file    Food insecurity - worry: Not on file    Food insecurity - inability: Not on file    Transportation needs - medical: Not on file    Transportation needs - non-medical: Not on file   Occupational History    Not on file   Tobacco Use    Smoking status: Never Smoker    Smokeless tobacco: Never Used   Substance and Sexual Activity    Alcohol use: Yes    Drug use: No    Sexual activity: Yes     Partners: Female   Other Topics Concern    Not on file   Social History Narrative    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         Current Medications  Current Outpatient Medications on File Prior to Visit   Medication Sig Dispense Refill    amlodipine-benazepril 5-20 mg (LOTREL) 5-20 mg per capsule Take 1 capsule by mouth once daily. 90 capsule 1    atorvastatin (LIPITOR) 10 MG tablet TAKE 1 TABLET BY MOUTH ONCE DAILY 90  "tablet 0    FLUVIRIN [id number] 45 mcg (15 mcg x 3)/0.5 mL Susp       colchicine (COLCRYS) 0.6 mg tablet 1.2 mg at the first sign of flare, followed in 1 hour with a single dose of 0.6 mg (maximum: 1.8 mg within 1 hour). 30 tablet 0    hydrOXYzine HCl (ATARAX) 25 MG tablet Take 1 tablet (25 mg total) by mouth every 12 (twelve) hours as needed for Itching. 20 tablet 0    triamcinolone acetonide 0.1% (KENALOG) 0.1 % cream IRMA AA ON THE SKIN QD TO BID  1     No current facility-administered medications on file prior to visit.        Allergies   Review of patient's allergies indicates:  No Known Allergies    Review of Systems (Pertinent positives)  Review of Systems   Constitutional: Negative.    HENT: Negative.    Eyes: Negative.    Respiratory: Negative.    Cardiovascular: Negative.    Gastrointestinal: Negative.    Musculoskeletal: Positive for joint pain.   Skin: Negative.          /84   Pulse 77   Temp 98 °F (36.7 °C) (Oral)   Resp 12   Ht 5' 11" (1.803 m)   Wt 102.3 kg (225 lb 8.5 oz)   SpO2 95%   BMI 31.46 kg/m²     GENERAL APPEARANCE: in no apparent distress and well developed and well nourished  HEENT: PERRL, EOMI, Sclera clear, anicteric, Oropharynx clear, no lesions, Neck supple with midline trachea  NECK: normal, supple, no adenopathy, thyroid normal in size  RESPIRATORY: appears well, vitals normal, no respiratory distress, acyanotic, normal RR, chest clear, no wheezing, crepitations, rhonchi, normal symmetric air entry  HEART: regular rate and rhythm, S1, S2 normal, no murmur, click, rub or gallop.    ABDOMEN: abdomen is soft without tenderness, no masses, no hernias, no organomegaly, no rebound, no guarding. Suprapubic tenderness absent. No CVA tenderness.  MS:  Tenderness on palpation of left great toe  SKIN: no rashes, no wounds, no other lesions  PSYCH: Alert, oriented x 3, thought content appropriate, speech normal, pleasant and cooperative, good eye contact, well " groomed    Assessment/Plan:  Ranjith Peguero is a 43 y.o. male who presents today for :    Ranjith was seen today for fmla paperwork.    Diagnoses and all orders for this visit:    Chronic idiopathic gout involving toe of left foot without tophus      1.  Paperwork filled out  2.  Follow up as needed  3.  Call if pain worsens or fails to resolve will send in prednisone      Sukumar Corbin MD

## 2018-11-14 ENCOUNTER — LAB VISIT (OUTPATIENT)
Dept: LAB | Facility: HOSPITAL | Age: 43
End: 2018-11-14
Attending: FAMILY MEDICINE
Payer: COMMERCIAL

## 2018-11-14 ENCOUNTER — OFFICE VISIT (OUTPATIENT)
Dept: FAMILY MEDICINE | Facility: CLINIC | Age: 43
End: 2018-11-14
Payer: COMMERCIAL

## 2018-11-14 VITALS
WEIGHT: 220.44 LBS | SYSTOLIC BLOOD PRESSURE: 122 MMHG | HEART RATE: 76 BPM | OXYGEN SATURATION: 96 % | DIASTOLIC BLOOD PRESSURE: 76 MMHG | TEMPERATURE: 98 F | HEIGHT: 71 IN | RESPIRATION RATE: 12 BRPM | BODY MASS INDEX: 30.86 KG/M2

## 2018-11-14 DIAGNOSIS — M1A.0720 CHRONIC IDIOPATHIC GOUT INVOLVING TOE OF LEFT FOOT WITHOUT TOPHUS: Primary | ICD-10-CM

## 2018-11-14 DIAGNOSIS — M1A.0720 CHRONIC IDIOPATHIC GOUT INVOLVING TOE OF LEFT FOOT WITHOUT TOPHUS: ICD-10-CM

## 2018-11-14 LAB — URATE SERPL-MCNC: 7.2 MG/DL

## 2018-11-14 PROCEDURE — 3074F SYST BP LT 130 MM HG: CPT | Mod: CPTII,S$GLB,, | Performed by: FAMILY MEDICINE

## 2018-11-14 PROCEDURE — 99214 OFFICE O/P EST MOD 30 MIN: CPT | Mod: S$GLB,,, | Performed by: FAMILY MEDICINE

## 2018-11-14 PROCEDURE — 36415 COLL VENOUS BLD VENIPUNCTURE: CPT | Mod: PN

## 2018-11-14 PROCEDURE — 3078F DIAST BP <80 MM HG: CPT | Mod: CPTII,S$GLB,, | Performed by: FAMILY MEDICINE

## 2018-11-14 PROCEDURE — 84550 ASSAY OF BLOOD/URIC ACID: CPT

## 2018-11-14 PROCEDURE — 3008F BODY MASS INDEX DOCD: CPT | Mod: CPTII,S$GLB,, | Performed by: FAMILY MEDICINE

## 2018-11-14 PROCEDURE — 99999 PR PBB SHADOW E&M-EST. PATIENT-LVL III: CPT | Mod: PBBFAC,,, | Performed by: FAMILY MEDICINE

## 2018-11-14 NOTE — PROGRESS NOTES
Routine Office Visit    Patient Name: Ranjith Peguero    : 1975  MRN: [id number]    Subjective:  Ranjith is a 43 y.o. male who presents today for:    1. Gout  Patient presenting today for follow up of gout.  He has not had any flares since last visit.  He is not on daily medications.  He is on colchicine as needed for flares.  No joint pains or muslce pains. No swelling of joints    Past Medical History  Past Medical History:   Diagnosis Date    Gout     L big toe    Hyperlipidemia     Hypertension        Past Surgical History  No past surgical history on file.    Family History  Family History   Problem Relation Age of Onset    Hypertension Mother     Hypertension Father        Social History  Social History     Socioeconomic History    Marital status:      Spouse name: Not on file    Number of children: Not on file    Years of education: Not on file    Highest education level: Not on file   Social Needs    Financial resource strain: Not on file    Food insecurity - worry: Not on file    Food insecurity - inability: Not on file    Transportation needs - medical: Not on file    Transportation needs - non-medical: Not on file   Occupational History    Not on file   Tobacco Use    Smoking status: Never Smoker    Smokeless tobacco: Never Used   Substance and Sexual Activity    Alcohol use: Yes    Drug use: No    Sexual activity: Yes     Partners: Female   Other Topics Concern    Not on file   Social History Narrative    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         Current Medications  Current Outpatient Medications on File Prior to Visit   Medication Sig Dispense Refill    amlodipine-benazepril 5-20 mg (LOTREL) 5-20 mg per capsule Take 1 capsule by mouth once daily. 90 capsule 1    atorvastatin (LIPITOR) 10 MG tablet TAKE 1 TABLET BY MOUTH ONCE DAILY 90 tablet 0    colchicine (COLCRYS) 0.6 mg tablet 1.2 mg at the first sign of flare, followed in 1 hour with a single dose  "of 0.6 mg (maximum: 1.8 mg within 1 hour). 30 tablet 0    FLUVIRIN [id number] 45 mcg (15 mcg x 3)/0.5 mL Susp       hydrOXYzine HCl (ATARAX) 25 MG tablet Take 1 tablet (25 mg total) by mouth every 12 (twelve) hours as needed for Itching. 20 tablet 0    triamcinolone acetonide 0.1% (KENALOG) 0.1 % cream IRMA AA ON THE SKIN QD TO BID  1     No current facility-administered medications on file prior to visit.        Allergies   Review of patient's allergies indicates:  No Known Allergies    Review of Systems (Pertinent positives)  Review of Systems   Constitutional: Negative.    HENT: Negative.    Eyes: Negative.    Respiratory: Negative.    Cardiovascular: Negative.    Musculoskeletal: Negative.    Skin: Negative.          /76 (BP Location: Right arm, Patient Position: Sitting, BP Method: Medium (Manual))   Pulse 76   Temp 98.2 °F (36.8 °C) (Oral)   Resp 12   Ht 5' 11" (1.803 m)   Wt 100 kg (220 lb 7.4 oz)   SpO2 96%   BMI 30.75 kg/m²     GENERAL APPEARANCE: in no apparent distress and well developed and well nourished  HEENT: PERRLA, EOMI, Sclera clear, anicteric, Oropharynx clear, no lesions, Neck supple with midline trachea  NECK: normal, supple, no adenopathy, thyroid normal in size  RESPIRATORY: appears well, vitals normal, no respiratory distress, acyanotic, normal RR, chest clear, no wheezing, crepitations, rhonchi, normal symmetric air entry  HEART: regular rate and rhythm, S1, S2 normal, no murmur, click, rub or gallop.    ABDOMEN: abdomen is soft without tenderness, no masses, no hernias, no organomegaly, no rebound, no guarding. Suprapubic tenderness absent. No CVA tenderness.  MS:  No joint deformities  SKIN: no rashes, no wounds, no other lesions  PSYCH: Alert, oriented x 3, thought content appropriate, speech normal, pleasant and cooperative, good eye contact, well groomed    Assessment/Plan:  Ranjith Peguero is a 43 y.o. male who presents today for :    Ranjith was seen today for recheck uric " acid.    Diagnoses and all orders for this visit:    Chronic idiopathic gout involving toe of left foot without tophus  -     Uric acid; Future      1.  Lab to be done today  2.  Will consider starting on allopurinol based on lab results  3.  Follow up as needed    Sukumar Corbin MD

## 2018-12-27 DIAGNOSIS — E78.5 DYSLIPIDEMIA: ICD-10-CM

## 2018-12-27 RX ORDER — ATORVASTATIN CALCIUM 10 MG/1
TABLET, FILM COATED ORAL
Qty: 90 TABLET | Refills: 0 | Status: SHIPPED | OUTPATIENT
Start: 2018-12-27 | End: 2019-03-31 | Stop reason: SDUPTHER

## 2019-01-02 DIAGNOSIS — M10.9 GOUT, UNSPECIFIED CAUSE, UNSPECIFIED CHRONICITY, UNSPECIFIED SITE: ICD-10-CM

## 2019-01-02 RX ORDER — COLCHICINE 0.6 MG/1
TABLET ORAL
Qty: 30 TABLET | Refills: 0 | Status: SHIPPED | OUTPATIENT
Start: 2019-01-02 | End: 2019-10-03

## 2019-03-31 DIAGNOSIS — E78.5 DYSLIPIDEMIA: ICD-10-CM

## 2019-03-31 RX ORDER — ATORVASTATIN CALCIUM 10 MG/1
TABLET, FILM COATED ORAL
Qty: 90 TABLET | Refills: 0 | Status: SHIPPED | OUTPATIENT
Start: 2019-03-31 | End: 2019-06-28 | Stop reason: SDUPTHER

## 2019-03-31 RX ORDER — ATORVASTATIN CALCIUM 10 MG/1
TABLET, FILM COATED ORAL
Qty: 90 TABLET | Refills: 0 | Status: SHIPPED | OUTPATIENT
Start: 2019-03-31 | End: 2019-10-03

## 2019-04-04 ENCOUNTER — OFFICE VISIT (OUTPATIENT)
Dept: FAMILY MEDICINE | Facility: CLINIC | Age: 44
End: 2019-04-04
Payer: COMMERCIAL

## 2019-04-04 VITALS
DIASTOLIC BLOOD PRESSURE: 86 MMHG | HEIGHT: 71 IN | TEMPERATURE: 98 F | WEIGHT: 227.06 LBS | BODY MASS INDEX: 31.79 KG/M2 | SYSTOLIC BLOOD PRESSURE: 129 MMHG | HEART RATE: 77 BPM | OXYGEN SATURATION: 96 %

## 2019-04-04 DIAGNOSIS — G89.29 CHRONIC BILATERAL LOW BACK PAIN WITH RIGHT-SIDED SCIATICA: ICD-10-CM

## 2019-04-04 DIAGNOSIS — I10 ESSENTIAL HYPERTENSION: ICD-10-CM

## 2019-04-04 DIAGNOSIS — M54.41 CHRONIC BILATERAL LOW BACK PAIN WITH RIGHT-SIDED SCIATICA: ICD-10-CM

## 2019-04-04 DIAGNOSIS — E66.09 CLASS 1 OBESITY DUE TO EXCESS CALORIES WITH SERIOUS COMORBIDITY AND BODY MASS INDEX (BMI) OF 31.0 TO 31.9 IN ADULT: ICD-10-CM

## 2019-04-04 DIAGNOSIS — M1A.0720 CHRONIC IDIOPATHIC GOUT INVOLVING TOE OF LEFT FOOT WITHOUT TOPHUS: Primary | ICD-10-CM

## 2019-04-04 DIAGNOSIS — R61 NIGHT SWEATS: ICD-10-CM

## 2019-04-04 PROCEDURE — 99999 PR PBB SHADOW E&M-EST. PATIENT-LVL IV: ICD-10-PCS | Mod: PBBFAC,,, | Performed by: FAMILY MEDICINE

## 2019-04-04 PROCEDURE — 99215 PR OFFICE/OUTPT VISIT, EST, LEVL V, 40-54 MIN: ICD-10-PCS | Mod: S$GLB,,, | Performed by: FAMILY MEDICINE

## 2019-04-04 PROCEDURE — 3008F BODY MASS INDEX DOCD: CPT | Mod: CPTII,S$GLB,, | Performed by: FAMILY MEDICINE

## 2019-04-04 PROCEDURE — 3079F PR MOST RECENT DIASTOLIC BLOOD PRESSURE 80-89 MM HG: ICD-10-PCS | Mod: CPTII,S$GLB,, | Performed by: FAMILY MEDICINE

## 2019-04-04 PROCEDURE — 99999 PR PBB SHADOW E&M-EST. PATIENT-LVL IV: CPT | Mod: PBBFAC,,, | Performed by: FAMILY MEDICINE

## 2019-04-04 PROCEDURE — 99215 OFFICE O/P EST HI 40 MIN: CPT | Mod: S$GLB,,, | Performed by: FAMILY MEDICINE

## 2019-04-04 PROCEDURE — 3074F SYST BP LT 130 MM HG: CPT | Mod: CPTII,S$GLB,, | Performed by: FAMILY MEDICINE

## 2019-04-04 PROCEDURE — 3079F DIAST BP 80-89 MM HG: CPT | Mod: CPTII,S$GLB,, | Performed by: FAMILY MEDICINE

## 2019-04-04 PROCEDURE — 3074F PR MOST RECENT SYSTOLIC BLOOD PRESSURE < 130 MM HG: ICD-10-PCS | Mod: CPTII,S$GLB,, | Performed by: FAMILY MEDICINE

## 2019-04-04 PROCEDURE — 3008F PR BODY MASS INDEX (BMI) DOCUMENTED: ICD-10-PCS | Mod: CPTII,S$GLB,, | Performed by: FAMILY MEDICINE

## 2019-04-04 RX ORDER — DICLOFENAC SODIUM 50 MG/1
50 TABLET, DELAYED RELEASE ORAL 2 TIMES DAILY
Qty: 60 TABLET | Refills: 0 | Status: SHIPPED | OUTPATIENT
Start: 2019-04-04 | End: 2019-06-10 | Stop reason: SDUPTHER

## 2019-04-04 RX ORDER — AMLODIPINE AND BENAZEPRIL HYDROCHLORIDE 5; 20 MG/1; MG/1
1 CAPSULE ORAL DAILY
Qty: 90 CAPSULE | Refills: 1 | Status: SHIPPED | OUTPATIENT
Start: 2019-04-04 | End: 2019-10-03 | Stop reason: SDUPTHER

## 2019-04-04 RX ORDER — BACLOFEN 10 MG/1
10 TABLET ORAL NIGHTLY
Qty: 30 TABLET | Refills: 1 | Status: SHIPPED | OUTPATIENT
Start: 2019-04-04 | End: 2019-09-09 | Stop reason: SDUPTHER

## 2019-04-04 NOTE — PROGRESS NOTES
Routine Office Visit    Patient Name: Ranjith Peguero    : 1975  MRN: [id number]    Subjective:  Ranjith is a 43 y.o. male who presents today for:    1. Gout  Patient presenting today for follow up of gout.  He has had one flare since last visit.  He is not on daily medications.  He is on colchicine as needed for flares.  No joint pains or muslce pains. No swelling of joints    2.  Low back pain  Patient presenting today with recurring lower back pain that is worsened with standing up and prolonged sitting.  He works as a  and is in and out of his truck all day.  He states that the pain does radiate down his right leg from time to time.  No numbness, tingling, weakness, or incontinence.  He states that he has been taking ibuprofen as directed when the pain gets severe and it does help some.    3.  Night sweats  Patient presenting with recurring night sweats that occur at random.  He states he will wake up drenched in sweat.  No fevers, chills, or cough.  He states that he has not had any weight loss, but has had weight gain.  He states that he wasn't sure if the covers were too thick or if something was going on.  Patient has gained 12 pounds over the past year, so thinks this may have contributed.      4.  Weight loss  Patient is interested in losing weight due to 12 pound weight gain.  He eats out a lot and consumes a large amount of carbs.  He states that it really bothers him to throw away food, so he eats all left over food.  He walks a lot every day, but does not go to the gym.  He is concerned about diets as he doesn't want to consume too much protein and induce a gout flare.  He is not interested in medications for weight loss at this time, but would like recommendations on dieting..    Past Medical History  Past Medical History:   Diagnosis Date    Gout     L big toe    Hyperlipidemia     Hypertension        Past Surgical History  History reviewed. No pertinent surgical history.    Family  History  Family History   Problem Relation Age of Onset    Hypertension Mother     Hypertension Father        Social History  Social History     Socioeconomic History    Marital status:      Spouse name: Not on file    Number of children: Not on file    Years of education: Not on file    Highest education level: Not on file   Occupational History    Not on file   Social Needs    Financial resource strain: Not on file    Food insecurity:     Worry: Not on file     Inability: Not on file    Transportation needs:     Medical: Not on file     Non-medical: Not on file   Tobacco Use    Smoking status: Never Smoker    Smokeless tobacco: Never Used   Substance and Sexual Activity    Alcohol use: Yes    Drug use: No    Sexual activity: Yes     Partners: Female   Lifestyle    Physical activity:     Days per week: Not on file     Minutes per session: Not on file    Stress: Not on file   Relationships    Social connections:     Talks on phone: Not on file     Gets together: Not on file     Attends Jewish service: Not on file     Active member of club or organization: Not on file     Attends meetings of clubs or organizations: Not on file     Relationship status: Not on file   Other Topics Concern    Not on file   Social History Narrative    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         Current Medications  Current Outpatient Medications on File Prior to Visit   Medication Sig Dispense Refill    atorvastatin (LIPITOR) 10 MG tablet TAKE 1 TABLET BY MOUTH ONCE DAILY 90 tablet 0    colchicine (COLCRYS) 0.6 mg tablet TAKE 2 TABLETS BY MOUTH AT FIRST SIGN OF FLARE, FOLLOWED IN ONE HOUR WITH 1 TABLET; MAX OF 3 TABLETS WITHIN ONE HOUR 30 tablet 0    triamcinolone acetonide 0.1% (KENALOG) 0.1 % cream IRMA AA ON THE SKIN QD TO BID  1    [DISCONTINUED] amlodipine-benazepril 5-20 mg (LOTREL) 5-20 mg per capsule Take 1 capsule by mouth once daily. 90 capsule 1    atorvastatin (LIPITOR) 10 MG  "tablet TAKE 1 TABLET BY MOUTH ONCE DAILY 90 tablet 0    FLUVIRIN [id number] 45 mcg (15 mcg x 3)/0.5 mL Susp       hydrOXYzine HCl (ATARAX) 25 MG tablet Take 1 tablet (25 mg total) by mouth every 12 (twelve) hours as needed for Itching. 20 tablet 0     No current facility-administered medications on file prior to visit.        Allergies   Review of patient's allergies indicates:  No Known Allergies    Review of Systems (Pertinent positives)  Review of Systems   Constitutional: Negative.    HENT: Negative.    Eyes: Negative.    Respiratory: Negative.    Cardiovascular: Negative.    Musculoskeletal: Positive for back pain and joint pain.   Skin: Negative.          /86 (BP Location: Right arm, Patient Position: Sitting, BP Method: Medium (Automatic))   Pulse 77   Temp 98 °F (36.7 °C) (Oral)   Ht 5' 11" (1.803 m)   Wt 103 kg (227 lb 1.2 oz)   SpO2 96%   BMI 31.67 kg/m²     GENERAL APPEARANCE: in no apparent distress and well developed and well nourished  HEENT: PERRL, EOMI, Sclera clear, anicteric, Oropharynx clear, no lesions, Neck supple with midline trachea  NECK: normal, supple, no adenopathy, thyroid normal in size  RESPIRATORY: appears well, vitals normal, no respiratory distress, acyanotic, normal RR, chest clear, no wheezing, crepitations, rhonchi, normal symmetric air entry  HEART: regular rate and rhythm, S1, S2 normal, no murmur, click, rub or gallop.    ABDOMEN: abdomen is soft without tenderness, no masses, no hernias, no organomegaly, no rebound, no guarding. Suprapubic tenderness absent. No CVA tenderness.  MS:  No joint deformities; no pain on palpation of spinous processes  SKIN: no rashes, no wounds, no other lesions  PSYCH: Alert, oriented x 3, thought content appropriate, speech normal, pleasant and cooperative, good eye contact, well groomed    Assessment/Plan:  Ranjith Peguero is a 43 y.o. male who presents today for :    Ranjith was seen today for medication refill.    Diagnoses and all " orders for this visit:    Chronic idiopathic gout involving toe of left foot without tophus  -     Uric acid; Future  -     Comprehensive metabolic panel; Future    Essential hypertension  -     amlodipine-benazepril 5-20 mg (LOTREL) 5-20 mg per capsule; Take 1 capsule by mouth once daily.    Chronic bilateral low back pain with right-sided sciatica  -     Ambulatory consult to [facility]  -     baclofen (LIORESAL) 10 MG tablet; Take 1 tablet (10 mg total) by mouth every evening.  -     diclofenac (VOLTAREN) 50 MG EC tablet; Take 1 tablet (50 mg total) by mouth 2 (two) times daily.    Night sweats  -     TSH; Future  -     CBC auto differential; Future      1.  Lab to be done today  2.  Will consider starting on allopurinol based on lab results  3.  Referred to [facility] for chronic low back pain  4.  He is to take all medications as prescribed and no driving after taking baclofen  5.  Encouraged him to cut back on carbs as this will help with weight loss   6.  Follow up 6 months or sooner if needed      MD Sukumar Reddy MD

## 2019-05-10 ENCOUNTER — CLINICAL SUPPORT (OUTPATIENT)
Dept: REHABILITATION | Facility: HOSPITAL | Age: 44
End: 2019-05-10
Attending: FAMILY MEDICINE
Payer: COMMERCIAL

## 2019-05-10 DIAGNOSIS — M54.50 ACUTE BILATERAL LOW BACK PAIN WITHOUT SCIATICA: ICD-10-CM

## 2019-05-10 DIAGNOSIS — M53.86 DECREASED RANGE OF MOTION OF LUMBAR SPINE: ICD-10-CM

## 2019-05-10 DIAGNOSIS — M62.81 WEAKNESS OF TRUNK MUSCULATURE: ICD-10-CM

## 2019-05-10 PROCEDURE — 97161 PT EVAL LOW COMPLEX 20 MIN: CPT | Mod: PN

## 2019-05-10 PROCEDURE — 97110 THERAPEUTIC EXERCISES: CPT | Mod: PN

## 2019-05-10 NOTE — PLAN OF CARE
[facility] - PHYSICAL THERAPY EVALUATION     Name: Ranjith Peguero  Clinic Number: [id number]    Therapy Diagnosis:   Encounter Diagnoses   Name Primary?    Acute bilateral low back pain without sciatica     Weakness of trunk musculature     Decreased range of motion of lumbar spine      Physician: Sukumar Corbin MD    Physician Orders: PT Eval and Treat   Medical Diagnosis from Referral: Chronic bilateral low back pain with right-sided sciatica  Evaluation Date: 5/10/2019  Authorization Period Expiration: 12/31/2019  Plan of Care Expiration: 8/10/2019  Reassessment Due: 6/10/2019  Visit # / Visits authorized: 1/ 20    Time In: 1:00 pm  Time Out: 2:30 pm  Total Billable Time: 90 minutes    Precautions: Standard    Pattern of pain determined: 1 PEN      Subjective   Date of onset:: 2 months   History of current condition - Ranjith reports: that he has lower back pain.JERICHO: pt denies any traumatic event. Pt states he has left knee pain, which he thinks he is compensate when he is walking. Pt states he walks a lot and rive trunk causing lower back pain and muscle spasm. Pt states pain is at worse at end of evening. Pt states he has throbbing pain in the lower  Back. Pt states sometimes he feels pain towards hips. Pt denies any bowel and bladder movement changes. Pt states he never tried therapy before. Pt did not have any injection. Aggravating, Walking and sitting down for long period of time. Easing factors: taking medication      Medical History:   Past Medical History:   Diagnosis Date    Gout     L big toe    Hyperlipidemia     Hypertension        Surgical History:   Ranjith Peguero  has no past surgical history on file.    Medications:   Ranjith has a current medication list which includes the following prescription(s): amlodipine-benazepril 5-20 mg, atorvastatin, atorvastatin, baclofen, colchicine, diclofenac, fluvirin [id number], hydroxyzine hcl, and triamcinolone acetonide 0.1%.    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   DME owned/used: No      Pain:  Current 3/10, worst 7/10, best 3/10   Location: bilateral lower back   Description: Throbbing  Aggravating Factors: Sitting, Standing and Walking  Easing Factors: pain medication  Disturbed Sleep: wakes up at night        Pattern of pain questions:  1.  Where is your pain the worst? Lower back pain  2.  Is your pain constant or intermittent?   3.  Does bending forward make your typical pain worse?  4.  Since the start of your back pain, has there been a change in your bowel or bladder? No  5.  What can't you do now that you use to be able to do? Work      Pts goals: eliminated lower back pain      Red Flag Screening:   Cough  Sneeze  Strain: (--)  Bladder/ bowel: (--)  Falls: (--)  Night pain: (--)  Unexplained weight loss: (--)  General health: Not identified     OBJECTIVE     Postural examination/scapula alignment: Slouched posture and Decreased lordosis  Sitting: Slouched posture and Decreased lordosis  Standing: Not identified   Correction of posture: better with lumbar roll    MOVEMENT LOSS    ROM Loss   Flexion moderate loss 31 deg   Extension moderate loss and major loss 11 deg   Side bending Right moderate loss 21   Side bending Left moderate loss 24 deg    Rotation Right minimal loss and moderate loss   Rotation Left minimal loss and moderate loss     Lower Extremity Strength  Right LE  Left LE    Hip flexion: 4+/5 Hip flexion: 4+/5   Hip extension:  4+/5 Hip extension: 4+/5   Hip abduction: 4+/5 Hip abduction: 4+/5   Hip adduction:  4+/5 Hip adduction:  4+/5   Hip Internal rotation   4+/5 Hip Internal rotation 4+/5   Knee Flexion 4+/5  Knee Flexion 4+/5   Knee Extension 4+/5 Knee Extension 4+/5   Ankle dorsiflexion: 4+/5 Ankle dorsiflexion: 4+/5   Ankle plantarflexion: 4+/5 Ankle plantarflexion: 4+/5       GAIT:  Assistive Device used: none  Level of Assistance: independent  Patient displays the following gait deviations:  no gait deviations observed.     Special Tests:   Test Name  Test Result   Prone Instability Test (--)   SI Joint Provocation Test (--)   Straight Leg Raise (--)   Neural Tension Test (--)   Crossed Straight Leg Raise (--)   Walking on toes (--)   Walking on heels  (--)   + Bridge test: +    Hamstring stretch WFL    NEUROLOGICAL SCREENING     Sensory deficit: LE intact     Reflexes:    Left Right   Patella Tendon 1+ 1+   Achilles Tendon 1+ 1+   Babinski  (--) (--)   Clonus (--) (--)     REPEATED TEST MOVEMENTS:  Repeated Flexion in Standing no effect   Repeated Extension in Standing end range pain  pain during motion   Repeated Flexion in lying better   Repeated Extension in lying  end range pain  pain during motion       STATIC TESTS   Sitting slouched  pain during motion   Sitting erect pain during motion   Standing slouched pain during motion   Standing erect  pain during motion   Lying prone in extension  end range pain  pain during motion   Long sitting   no worse  no better       Baseline Isometric Testing on Med X equipment: Testing administered by PT  Date of testin/10/2019  ROM 0 to 48 deg   Max Peak Torque 125 ft.lbs   Min Peak Torque 76 ft.lbs   Flex/Ext Ratio 1.6   % below normative data 55%   Counter weight 179   femur 4   Seat pad 0       CMS Impairment/Limitation/Restriction for FOTO Lumbar Spine Survey  Status Limitation G-Code CMS Severity Modifier  Intake 59% 41% Current Status CK - At least 40 percent but less than 60 percent  Predicted 71% 29% Goal Status+ CJ - At least 20 percent but less than 40 percent  D/C Status CK **only report if this is a one time visit        Treatment   Treatment Time In: 1:50  pm  Treatment Time Out:  2:30 pm  Total Treatment time separate from Evaluation: 40 minutes      Ranjith received therapeutic exercises to develop/improve posture, lumbar/cervical ROM, strength and muscular endurance for 40 minutes including the following exercises:     Double knee to chest  Lower trunk rotation   Open book      [facility] 5/10/2019   Visit Number 1   VAS Pain Rating 4   Lumbar Extension Seat Pad 0   Femur Restraint 4   Top Dead Center 24   Counterweight 179   Lumbar Flexion 48   Lumbar Extension 0   Lumbar Peak Torque 125   Min Torque 76   Test Percent Below Normative Data 55   Lumbar Weight 65   Ice - Z Lie (in min.) 10         Written Home Exercises Provided: Patient instructed to cont prior HEP.  Exercises were reviewed and Ranjith was able to demonstrate them prior to the end of the session.  Ranjith demonstrated good  understanding of the education provided.   See EMR under Patient Instructions for exercises provided 5/10/2019.      Education provided:   - Patient received education regarding proper posture and body mechanics.  Patient was given top 10 tips handout which discusses posture seated, standing, lifting correctly, components of exercise, importance of nutrition and hydration, and importance of sleep.  - Mathew roll tried, recommended, and purchase information was provided.    - Patient received a handout regarding anticipated muscular soreness following the isometric test and strategies for management were reviewed with patient including stretching, using ice and scheduled rest.   - Patient received education on the [facility] program, purpose of the isometric test, progression of back strengthening as well as wellness approach and systemic strengthening.  Details of the program were discussed.  Reviewed that patient should feel support/pressure from med ex restraints but no pain or discomfort and patient expressed understanding.      Ranjith received cold pack for 10 minutes  to lower back.    Assessment   Ranjith is a 43 y.o. male referred to [facility] with a medical diagnosis of Chronic bilateral low back pain with right-sided sciatica. Pt presents with acute lower back pain that has started aggravating two months ago. 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 Pt demonstrated decreased sitting posture with slouched postured and decrease lumbar lordosis. Pt demonstrated decrease of lumbar range of motion in flexion and extension, but he demonstrated increase pain in lumbar extension. During supine and prone position, pt did not demonstrated pain during double knee to chest, but he did presented pain during prone on elbows. Pt demonstrated positive during bridge test for poor activations, which could be causing lower back pain. During Medx lumbar extension, pt demonstrated weakness of lumbar extensors, pt demonstrated max peak torque of 125 ft.lbs and min torque of 76 ft.lbs. Pt demonstrated below the normative data by 55%. Pt will benefit from skilled PT services to decrease functional limitations.     Pain Pattern: 1 PEN    Pt prognosis is Good.   Pt will benefit from skilled outpatient Physical Therapy to address the deficits stated above and in the chart below, provide pt/family education, and to maximize pt's level of independence.     Plan of care discussed with patient: Yes  Pt's spiritual, cultural and educational needs considered and patient is agreeable to the plan of care and goals as stated below:     Anticipated Barriers for therapy: None    PT Evaluation Completed? Yes    Medical necessity is demonstrated by the following problem list.    Pt presents with the following impairments:     History  Co-morbidities and personal factors that may impact the plan of care Co-morbidities:   Not identified    Personal Factors:   no deficits     low   Examination  Body Structures and Functions, activity  limitations and participation restrictions that may impact the plan of care Body Regions:   back    Body Systems:    ROM  strength    Participation Restrictions:   Work    Activity limitations:   Learning and applying knowledge  no deficits    General Tasks and Commands  no deficits    Communication  no deficits    Mobility  walking    Self care  no deficits    Domestic Life  doing house work (cleaning house, washing dishes, laundry)    Interactions/Relationships  no deficits    Life Areas  no deficits    Community and Social Life  community life  recreation and leisure         low   Clinical Presentation stable and uncomplicated low   Decision Making/ Complexity Score: low       GOALS: Pt is in agreement with the following goals.    Short term goals:  6 weeks or 10 visits   1.  Pt will demonstrate increased lumbar ROM by at least 3 degrees from the initial ROM value with improvements noted in functional ROM and ability to perform ADLs.  2.  Pt will demonstrate increased maximum isometric torque value by 30% when compared to the initial value resulting in improved ability to perform bending, lifting, and carrying activities safely, confidently.  3.  Patient report a reduction in worst pain score by 1-2 points for improved tolerance for work.  4.  Pt able to perform HEP correctly with minimal cueing or supervision from therapist to encourage independent management of symptoms.       Long term goals: 13 weeks or 20 visits   1. Pt will demonstrate increased lumbar ROM by at least 3 degrees from initial ROM value, resulting in improved ability to perform functional fwd bending while standing and sitting.   2. Pt will demonstrate increased maximum isometric torque value by 50% when compared to the initial value resulting in improved ability to perform bending, lifting, and carrying activities safely, confidently.  3. Pt to demonstrate ability to independently control and reduce their pain through posture positioning and  "mechanical movements throughout a typical day.  4.  Patient will demonstrate improved overall function per FOTO Survey to CJ = at least 20% but < 40% impaired, limited or restricted score or less in order to show subjective improvement of condition.  5. Pt will demonstrate independence with the HEP at discharge  6.  Pt will be able to work without any lower back pain to improve quality of life.       Plan   Outpatient physical therapy 2x week for 13 weeks or 20 visits to include the following:   - Patient education  - Therapeutic exercise  - Manual therapy  - Performance testing   - Neuromuscular Re-education  - Therapeutic activity   - Modalities    Pt may be seen by PTA as part of the rehabilitation team.     Therapist: Jean-Claude Whatley, PT  5/10/2019    "I certify the need for these services furnished under this plan of treatment and while under my care."    ____________________________________  Physician/Referring Practitioner    _______________  Date of Signature              "

## 2019-05-20 ENCOUNTER — CLINICAL SUPPORT (OUTPATIENT)
Dept: REHABILITATION | Facility: HOSPITAL | Age: 44
End: 2019-05-20
Attending: FAMILY MEDICINE
Payer: COMMERCIAL

## 2019-05-20 DIAGNOSIS — M62.81 WEAKNESS OF TRUNK MUSCULATURE: ICD-10-CM

## 2019-05-20 DIAGNOSIS — M53.86 DECREASED RANGE OF MOTION OF LUMBAR SPINE: ICD-10-CM

## 2019-05-20 DIAGNOSIS — M54.50 ACUTE BILATERAL LOW BACK PAIN WITHOUT SCIATICA: ICD-10-CM

## 2019-05-20 PROCEDURE — 97110 THERAPEUTIC EXERCISES: CPT | Mod: PN | Performed by: PHYSICAL MEDICINE & REHABILITATION

## 2019-05-20 NOTE — PATIENT INSTRUCTIONS
1. Exercises  A. Supine Knee-to-Chest, Bilateral, do with ball, useful after standing and walking    Lie on back, hands clasped behind both knees. Pull knees in toward chest until a comfortable stretch is felt in lower back and buttocks. Hold 3 seconds.  Repeat 10 times per session. Do 3 sessions per day.        D. Flexors Stretch, Standing    Stand about a thigh's length from table.  edges of table. Bend knees until stretch is felt under shoulder blades in chest. Hold 3 seconds.  Repeat 10 times per session. Do 3 sessions per day.                 Positioning - Z LIE  Decompression Exercise: Leg Support.  Can be done on floor or bed with legs on couch, therapy ball, chair, or auto man        Lie on back on firm surface arms turned up, out to sides, backs of hands down. Support under legs: 90/90 position. Time 10 minutes.  Surface: floor      Copyright © [facility]. All rights reserved.       Backward Bend (Standing)   AFTER SITTING        Arch backward to make hollow of back deeper. Hold __3_ seconds.  Repeat ____ times per set. Do ____ sets per session. Do ____ sessions per day.     [email or web address]             Back Exercises: Lower Back Rotation    To start, lie on your back with your knees bent and feet flat on the floor. Dont press your neck or lower back to the floor. Breathe deeply. You should feel comfortable and relaxed in this position.  · Drop both knees to one side.  Keep your shoulders and head in neutral.   Keep your shoulders flat on the floor.  · Do not push through pain.  · Hold for 2-5 seconds.  · Return to the middle and then slowly switch sides.  · Repeat 5-10 times.  Date Last Reviewed: 10/11/2015  © 2693-7598 [employer]. [address] [place] [address], [place] [address]. All rights reserved. This information is not intended as a substitute for professional medical care. Always follow your healthcare professional's instructions.          Thoracic Rotation - Open Book      To start,  lie on your side with your knees bent.   Keep your arms out in front of you, head relaxed on pillow.   Allow your top arm to reach up towards the ceiling and then behind you, follow with your head, but keep head on pillow.   Breathe deeply. You should feel comfortable and relaxed in this position.  · Do not push through pain.  · Hold for 2-5 seconds.  · Return to the starting position, then repeat.    · Repeat 5-10 times.  · Switch sides    Powered by [email or web address]

## 2019-05-20 NOTE — PROGRESS NOTES
[facility]      Name: Ranjith Peguero  Clinic Number: [id number]    Therapy Diagnosis:   Encounter Diagnoses   Name Primary?    Acute bilateral low back pain without sciatica     Weakness of trunk musculature     Decreased range of motion of lumbar spine      Physician: Sukumar Corbin MD    Visit Date: 2019    Physician Orders: eval and treat  Medical Diagnosis: chronic low back pain with right sciatica  Evaluation Date: 5/10/19  Authorization Period Expiration: 19  Reassessment Due: 6/10/19  Plan of Care Certification Period: 8/10/19 ( POC signed 5/10/19)  Visit #/Visits authorized:     Time In: 8:30 am  Time Out: 9:45 pm  Total Billable Time: 45 minutes    Precautions: Standard    Pattern of pain determined: 1 PEN    Subjective   Ranjith reports he is doing well as he is on vacation this week for his kids graduations.   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  He is sore if he sits too long or walks too far.  He has done his stretches a little.  Reviewed HEP, recommended roll, he will look into it.  No pain today.      Patient reports tolerating previous visit mild soreness after test  Patient reports their pain to be 0/10 on a 0-10 scale with 0 being no pain and 10 being the worst pain imaginable.  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  Pt goals: recreate with family and less pain after work    Objective        Baseline Isometric Testing on Med X equipment: Testing administered by PT  Date of testin/10/2019  ROM 0 to 48 deg   Max Peak Torque 125 ft.lbs   Min Peak Torque 76 ft.lbs   Flex/Ext Ratio 1.6   % below normative data 55%   Counter weight 179   femur 4   Seat pad 0         Outcomes:  Initial score:  41% impaired  Visit 5 score:  Goal:      Treatment    Pt was instructed in and performed the following:     Ranjith received therapeutic exercises to develop/improved posture, cardiovascular endurance, muscular endurance, lumbar ROM,  strength and muscular endurance for 50 minutes including the following exercises:     Flexion in lie, particularly after walking/standing at work 10 reps  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  LTR 10 reps  Open books 10 reps  Ext in standing after sitting    [facility] 5/20/2019   Visit Number 2   VAS Pain Rating 0   Treadmill Time (in min.) 10   Speed 2   Extension in Standing 10   Flexion in Lying 10   Lumbar Extension Seat Pad -   Femur Restraint -   Top Dead Center -   Counterweight -   Lumbar Flexion -   Lumbar Extension -   Lumbar Peak Torque -   Min Torque -   Test Percent Below Normative Data -   Lumbar Weight 65   Repetitions 20   Rating of Perceived Exertion 3   Ice - Z Lie (in min.) 10             Peripheral muscle strengthening which included 1 set of 15-20 repetitions at a slow, controlled 10-13 second per rep pace focused on strengthening supporting musculature for improved body mechanics and functional mobility.  Pt and therapist focused on proper form during treatment to ensure optimal strengthening of each targeted muscle group.  Machines were utilized including torso rotation,  chest press, upright row, tricep extension, bicep curl.   leg extension, leg curl,leg press, and hip abduction and add  added visit 3      Home Exercises Provided and Patient Education Provided   Flexion in lie  Ext in standing  LTR  Open books    Education provided:   - lumbar roll, curve reversal, using stretches to manage back pain    Written Home Exercises Provided: yes.  Exercises were reviewed and Ranjith was able to demonstrate them prior to the end of the session.  Ranjith demonstrated good  understanding of the education provided.     See EMR under Patient Instructions for exercises provided 5/20/2019.          Assessment       Patient is making good progress towards established goals.   HE tolerated visit very well with no pain.  Recommend 5-7% increase in lumbar weight next visit, he  tolerated weight well today with back exertion and no pain.  Review mechanical stretching to reduce symptoms.    Pt will continue to benefit from skilled outpatient physical therapy to address the deficits stated in the impairment chart, provide pt/family education and to maximize pt's level of independence in the home and community environment.     Anticipated Barriers for therapy: nil  Pt's spiritual, cultural and educational needs considered and pt agreeable to plan of care and goals as stated below:             Goals:   Short term goals:  6 weeks or 10 visits   1.  Pt will demonstrate increased lumbar ROM by at least 3 degrees from the initial ROM value with improvements noted in functional ROM and ability to perform ADLs.  Appropriate and ongoing  2.  Pt will demonstrate increased maximum isometric torque value by 30% when compared to the initial value resulting in improved ability to perform bending, lifting, and carrying activities safely, confidently.Appropriate and ongoing  3.  Patient report a reduction in worst pain score by 1-2 points for improved tolerance for work.Appropriate and ongoing  4.  Pt able to perform HEP correctly with minimal cueing or supervision from therapist to encourage independent management of symptoms. Appropriate and ongoing        Long term goals: 13 weeks or 20 visits   1. Pt will demonstrate increased lumbar ROM by at least 3 degrees from initial ROM value, resulting in improved ability to perform functional fwd bending while standing and sitting. Appropriate and ongoing  2. Pt will demonstrate increased maximum isometric torque value by 50% when compared to the initial value resulting in improved ability to perform bending, lifting, and carrying activities safely, confidently.Appropriate and ongoing  3. Pt to demonstrate ability to independently control and reduce their pain through posture positioning and mechanical movements throughout a typical day.Appropriate and  ongoing  4.  Patient will demonstrate improved overall function per FOTO Survey to CJ = at least 20% but < 40% impaired, limited or restricted score or less in order to show subjective improvement of condition.  Appropriate and ongoing  5. Pt will demonstrate independence with the HEP at discharge  Appropriate and ongoing  6.  Pt will be able to work without any lower back pain to improve quality of life.   Appropriate and ongoing         Plan   Continue with established Plan of Care towards established PT goals.   Increase lumbar weight by 5-7% next visit, introduce remainder of peripheral machines, and address lifting ergonomics

## 2019-05-23 ENCOUNTER — CLINICAL SUPPORT (OUTPATIENT)
Dept: REHABILITATION | Facility: HOSPITAL | Age: 44
End: 2019-05-23
Attending: FAMILY MEDICINE
Payer: COMMERCIAL

## 2019-05-23 DIAGNOSIS — M54.50 ACUTE BILATERAL LOW BACK PAIN WITHOUT SCIATICA: ICD-10-CM

## 2019-05-23 DIAGNOSIS — M62.81 WEAKNESS OF TRUNK MUSCULATURE: ICD-10-CM

## 2019-05-23 DIAGNOSIS — M53.86 DECREASED RANGE OF MOTION OF LUMBAR SPINE: ICD-10-CM

## 2019-05-23 PROCEDURE — 97110 THERAPEUTIC EXERCISES: CPT | Mod: PN

## 2019-05-23 NOTE — PROGRESS NOTES
[facility]      Name: Ranjith Peguero  Clinic Number: [id number]    Therapy Diagnosis:   Encounter Diagnoses   Name Primary?    Acute bilateral low back pain without sciatica     Weakness of trunk musculature     Decreased range of motion of lumbar spine      Physician: Sukumar Corbin MD    Visit Date: 2019    Physician Orders: eval and treat  Medical Diagnosis: chronic low back pain with right sciatica  Evaluation Date: 5/10/19  Authorization Period Expiration: 19  Reassessment Due: 6/10/19  Plan of Care Certification Period: 8/10/19 ( POC signed 5/10/19)  Visit #/Visits authorized: 3/20    Time In: 0700  Time Out: 0755  Total Billable Time: 30 minutes    Precautions: Standard    Pattern of pain determined: 1 PEN    Subjective   Ranjith reports he is doing well and his son's graduation is this morning.     Patient reports tolerating previous visit mild soreness after test  Patient reports their pain to be 4/10 on a 0-10 scale with 0 being no pain and 10 being the worst pain imaginable.  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  Pt goals: recreate with family and less pain after work    Objective        Baseline Isometric Testing on Med X equipment: Testing administered by PT  Date of testin/10/2019  ROM 0 to 48 deg   Max Peak Torque 125 ft.lbs   Min Peak Torque 76 ft.lbs   Flex/Ext Ratio 1.6   % below normative data 55%   Counter weight 179   femur 4   Seat pad 0         Outcomes:  Initial score:  41% impaired  Visit 5 score:  Goal:      Treatment    Pt was instructed in and performed the following:     Ranjith received therapeutic exercises to develop/improved posture, cardiovascular endurance, muscular endurance, lumbar ROM, strength and muscular endurance for 50 minutes including the following exercises:     Flexion in lie, particularly after walking/standing at work 10 reps NP  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  NP  LTR 10 reps  DKTC 10 reps  Open books 10 reps   Ext in standing after sitting     [facility] 5/23/2019   Visit Number 3   VAS Pain Rating 0   Treadmill Time (in min.) 10   Speed 2   Extension in Standing -   Flexion in Lying 10   Lumbar Extension Seat Pad -   Femur Restraint -   Top Dead Center -   Counterweight -   Lumbar Flexion -   Lumbar Extension -   Lumbar Peak Torque -   Min Torque -   Test Percent Below Normative Data -   Lumbar Weight 69   Repetitions 20   Rating of Perceived Exertion 3   Ice - Z Lie (in min.) 0             Peripheral muscle strengthening which included 1 set of 15-20 repetitions at a slow, controlled 10-13 second per rep pace focused on strengthening supporting musculature for improved body mechanics and functional mobility.  Pt and therapist focused on proper form during treatment to ensure optimal strengthening of each targeted muscle group.  Machines were utilized including torso rotation,  chest press, upright row, tricep extension, bicep curl.   leg extension, leg curl,leg press, and hip abduction and add  added visit 3.       Home Exercises Provided and Patient Education Provided   Flexion in lie  Ext in standing  LTR  Open books    Education provided:   - lumbar roll, curve reversal, using stretches to manage back pain    Written Home Exercises Provided: yes.  Exercises were reviewed and Ranjith was able to demonstrate them prior to the end of the session.  Ranjith demonstrated good  understanding of the education provided.     See EMR under Patient Instructions for exercises provided 5/20/2019.          Assessment       Patient is making good progress towards established goals.   He entered today with 4/10 pain, but reported 0 pain prior to Lumbar Medx machine and tolerated well.  Increased resistance at 69 ft/lbs achieving 20 reps and reporting RPE 3/10. Recommend an additional 5-7% increase in lumbar weight next visit. Lower body exercises added today with good  tolerance. Patient denies CP at end of session reporting no pain at exit.     Pt will continue to benefit from skilled outpatient physical therapy to address the deficits stated in the impairment chart, provide pt/family education and to maximize pt's level of independence in the home and community environment.     Anticipated Barriers for therapy: nil  Pt's spiritual, cultural and educational needs considered and pt agreeable to plan of care and goals as stated below:             Goals:   Short term goals:  6 weeks or 10 visits   1.  Pt will demonstrate increased lumbar ROM by at least 3 degrees from the initial ROM value with improvements noted in functional ROM and ability to perform ADLs.  Appropriate and ongoing  2.  Pt will demonstrate increased maximum isometric torque value by 30% when compared to the initial value resulting in improved ability to perform bending, lifting, and carrying activities safely, confidently.Appropriate and ongoing  3.  Patient report a reduction in worst pain score by 1-2 points for improved tolerance for work.Appropriate and ongoing  4.  Pt able to perform HEP correctly with minimal cueing or supervision from therapist to encourage independent management of symptoms. Appropriate and ongoing        Long term goals: 13 weeks or 20 visits   1. Pt will demonstrate increased lumbar ROM by at least 3 degrees from initial ROM value, resulting in improved ability to perform functional fwd bending while standing and sitting. Appropriate and ongoing  2. Pt will demonstrate increased maximum isometric torque value by 50% when compared to the initial value resulting in improved ability to perform bending, lifting, and carrying activities safely, confidently.Appropriate and ongoing  3. Pt to demonstrate ability to independently control and reduce their pain through posture positioning and mechanical movements throughout a typical day.Appropriate and ongoing  4.  Patient will demonstrate  improved overall function per FOTO Survey to CJ = at least 20% but < 40% impaired, limited or restricted score or less in order to show subjective improvement of condition.  Appropriate and ongoing  5. Pt will demonstrate independence with the HEP at discharge  Appropriate and ongoing  6.  Pt will be able to work without any lower back pain to improve quality of life.   Appropriate and ongoing         Plan   Continue with established Plan of Care towards established PT goals.   Increase lumbar weight by 5-7% next visit and address lifting ergonomics.

## 2019-05-27 ENCOUNTER — CLINICAL SUPPORT (OUTPATIENT)
Dept: REHABILITATION | Facility: HOSPITAL | Age: 44
End: 2019-05-27
Attending: FAMILY MEDICINE
Payer: COMMERCIAL

## 2019-05-27 DIAGNOSIS — M54.50 ACUTE BILATERAL LOW BACK PAIN WITHOUT SCIATICA: ICD-10-CM

## 2019-05-27 DIAGNOSIS — M53.86 DECREASED RANGE OF MOTION OF LUMBAR SPINE: ICD-10-CM

## 2019-05-27 DIAGNOSIS — M62.81 WEAKNESS OF TRUNK MUSCULATURE: ICD-10-CM

## 2019-05-27 PROCEDURE — 97110 THERAPEUTIC EXERCISES: CPT | Mod: PN | Performed by: PHYSICAL MEDICINE & REHABILITATION

## 2019-05-27 NOTE — PATIENT INSTRUCTIONS
1. Exercises  A. Supine Knee-to-Chest, Bilateral, do with ball, useful after standing and walking    Lie on back, hands clasped behind both knees. Pull knees in toward chest until a comfortable stretch is felt in lower back and buttocks. Hold 3 seconds.  Repeat 10 times per session. Do 3 sessions per day.        D. Flexors Stretch, Standing    Stand about a thigh's length from table.  edges of table. Bend knees until stretch is felt under shoulder blades in chest. Hold 3 seconds.  Repeat 10 times per session. Do 3 sessions per day.                 Positioning - Z LIE  Decompression Exercise: Leg Support.  Can be done on floor or bed with legs on couch, therapy ball, chair, or auto man        Lie on back on firm surface arms turned up, out to sides, backs of hands down. Support under legs: 90/90 position. Time 10 minutes.  Surface: floor      Copyright © [facility]. All rights reserved.       Backward Bend (Standing)   AFTER SITTING        Arch backward to make hollow of back deeper. Hold __3_ seconds.  Repeat ____ times per set. Do ____ sets per session. Do ____ sessions per day.     [email or web address]             Back Exercises: Lower Back Rotation    To start, lie on your back with your knees bent and feet flat on the floor. Dont press your neck or lower back to the floor. Breathe deeply. You should feel comfortable and relaxed in this position.  · Drop both knees to one side.  Keep your shoulders and head in neutral.   Keep your shoulders flat on the floor.  · Do not push through pain.  · Hold for 2-5 seconds.  · Return to the middle and then slowly switch sides.  · Repeat 5-10 times.  Date Last Reviewed: 10/11/2015  © 2154-9808 [employer]. [address] [place] [address], [place] [address]. All rights reserved. This information is not intended as a substitute for professional medical care. Always follow your healthcare professional's instructions.          Thoracic Rotation - Open Book      To start,  lie on your side with your knees bent.   Keep your arms out in front of you, head relaxed on pillow.   Allow your top arm to reach up towards the ceiling and then behind you, follow with your head, but keep head on pillow.   Breathe deeply. You should feel comfortable and relaxed in this position.  · Do not push through pain.  · Hold for 2-5 seconds.  · Return to the starting position, then repeat.    · Repeat 5-10 times.  · Switch sides    Powered by [email or web address]

## 2019-05-27 NOTE — PROGRESS NOTES
[facility]      Name: Ranjith Peguero  Clinic Number: [id number]    Therapy Diagnosis:   Encounter Diagnoses   Name Primary?    Acute bilateral low back pain without sciatica     Weakness of trunk musculature     Decreased range of motion of lumbar spine      Physician: Sukumar Corbin MD    Visit Date: 2019    Physician Orders: eval and treat  Medical Diagnosis: chronic low back pain with right sciatica  Evaluation Date: 5/10/19  Authorization Period Expiration: 19  Reassessment Due: 19  Plan of Care Certification Period: 8/10/19 ( POC signed 5/10/19)  Visit #/Visits authorized:     Time In: 8:30 am  Time Out: 9:30 am  Total Billable Time: 40 minutes    Precautions: Standard    Pattern of pain determined: 1 PEN    Subjective   Ranjith reports he is doing well.  He has no pain today.  He has been off work for 1 1/2 weeks for his  son's graduation.  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   No pain today, no pain during sitting or standing at graduation and parties.     Patient reports tolerating previous visit without pain  Patient reports their pain to be 0/10 on a 0-10 scale with 0 being no pain and 10 being the worst pain imaginable.  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  Pt goals: recreate with family and less pain after work    Objective        Baseline Isometric Testing on Med X equipment: Testing administered by PT  Date of testin/10/2019  ROM 0 to 48 deg   Max Peak Torque 125 ft.lbs   Min Peak Torque 76 ft.lbs   Flex/Ext Ratio 1.6   % below normative data 55%   Counter weight 179   femur 4   Seat pad 0       SFMA assessment 19 reveals:  Flexion test non painful dysfunctional due to reduced back ROM, and hamstrings tension  Extension test non painful dysfunctional due to reduced back ROM and trunk weakness      Outcomes:  Initial  score:  41% impaired  Visit 5 score:  Goal:      Treatment    Pt was instructed in and performed the following:     Ranjith received therapeutic exercises to develop/improved posture, cardiovascular endurance, muscular endurance, lumbar ROM, strength and muscular endurance for 50 minutes including the following exercises:     Flexion in lie, reviewed particularly after walking/standing at work 10 reps    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 )  Ham stretching taught at stair, in chair and supine with strap for when he can work into his day  Calf stretching at stairs  Stretching calf and hams 10 sec 5 reps    LTR 10 reps  DKTC 10 reps  Open books 10 reps   Ext in standing after sitting       [facility] 5/27/2019   Visit Number 4   VAS Pain Rating 0   Treadmill Time (in min.) 10   Speed 2.8   Extension in Standing 10   Flexion in Lying 10   Lumbar Extension Seat Pad -   Femur Restraint -   Top Dead Center -   Counterweight -   Lumbar Flexion -   Lumbar Extension -   Lumbar Peak Torque -   Min Torque -   Test Percent Below Normative Data -   Lumbar Weight 75   Repetitions 17   Rating of Perceived Exertion 4   Ice - Z Lie (in min.) 10           Peripheral muscle strengthening which included 1 set of 15-20 repetitions at a slow, controlled 10-13 second per rep pace focused on strengthening supporting musculature for improved body mechanics and functional mobility.  Pt and therapist focused on proper form during treatment to ensure optimal strengthening of each targeted muscle group.  Machines were utilized including torso rotation,  chest press, upright row, tricep extension, bicep curl.   leg extension, leg curl,leg press, and hip abduction and add  added visit 3.       Home Exercises Provided and Patient Education Provided   Flexion in lie  Ext in standing  LTR  Open books  Hamstring stretching (stair, chair and with strap all taught for convenience during day)    Education  provided:   - lumbar roll, curve reversal, using stretches to manage back pain    Written Home Exercises Provided: yes.  Exercises were reviewed and Ranjith was able to demonstrate them prior to the end of the session.  Ranjith demonstrated good  understanding of the education provided.     See EMR under Patient Instructions for exercises provided 5/20/2019.          Assessment       Patient is making good progress towards established goals.   Selective function movement exam confirms continued loss of lumbar motion and strength and tight hamstrings.   HEP reviewed and added to.  He entered today with 0/10 pain, and reported no pain during visit.  Exertion but no pain on  Lumbar Medx machine and tolerated well.  Increased resistance at 75 ft/lbs with 1 min warm up first.  17 reps and reporting RPE 4/10-patient attending again tomorrow as he has no other days off work this week.  Maintain weight for tomorrow, and recommend 15 reps on peripheral weights as he did mostly 20 today.   Remind him to try lumbar roll at work Wednesday and utilize stretches taught to try to manage symptoms when returning to work.       Pt will continue to benefit from skilled outpatient physical therapy to address the deficits stated in the impairment chart, provide pt/family education and to maximize pt's level of independence in the home and community environment.     Anticipated Barriers for therapy: nil  Pt's spiritual, cultural and educational needs considered and pt agreeable to plan of care and goals as stated below:             Goals:   Short term goals:  6 weeks or 10 visits   1.  Pt will demonstrate increased lumbar ROM by at least 3 degrees from the initial ROM value with improvements noted in functional ROM and ability to perform ADLs.  Appropriate and ongoing  2.  Pt will demonstrate increased maximum isometric torque value by 30% when compared to the initial value resulting in improved ability to perform bending, lifting, and  carrying activities safely, confidently.Appropriate and ongoing  3.  Patient report a reduction in worst pain score by 1-2 points for improved tolerance for work.Appropriate and ongoing  4.  Pt able to perform HEP correctly with minimal cueing or supervision from therapist to encourage independent management of symptoms. Appropriate and ongoing        Long term goals: 13 weeks or 20 visits   1. Pt will demonstrate increased lumbar ROM by at least 3 degrees from initial ROM value, resulting in improved ability to perform functional fwd bending while standing and sitting. Appropriate and ongoing  2. Pt will demonstrate increased maximum isometric torque value by 50% when compared to the initial value resulting in improved ability to perform bending, lifting, and carrying activities safely, confidently.Appropriate and ongoing  3. Pt to demonstrate ability to independently control and reduce their pain through posture positioning and mechanical movements throughout a typical day.Appropriate and ongoing  4.  Patient will demonstrate improved overall function per FOTO Survey to CJ = at least 20% but < 40% impaired, limited or restricted score or less in order to show subjective improvement of condition.  Appropriate and ongoing  5. Pt will demonstrate independence with the HEP at discharge  Appropriate and ongoing  6.  Pt will be able to work without any lower back pain to improve quality of life.   Appropriate and ongoing         Plan   Continue with established Plan of Care towards established PT goals.  Begin to address lifting ergonomics and carrying for work, request patient bring in mail bag to assess ergonomics carrying bag.

## 2019-05-28 ENCOUNTER — CLINICAL SUPPORT (OUTPATIENT)
Dept: REHABILITATION | Facility: HOSPITAL | Age: 44
End: 2019-05-28
Attending: FAMILY MEDICINE
Payer: COMMERCIAL

## 2019-05-28 DIAGNOSIS — M54.50 ACUTE BILATERAL LOW BACK PAIN WITHOUT SCIATICA: ICD-10-CM

## 2019-05-28 DIAGNOSIS — M53.86 DECREASED RANGE OF MOTION OF LUMBAR SPINE: ICD-10-CM

## 2019-05-28 DIAGNOSIS — M62.81 WEAKNESS OF TRUNK MUSCULATURE: ICD-10-CM

## 2019-05-28 PROCEDURE — 97110 THERAPEUTIC EXERCISES: CPT | Mod: PN

## 2019-05-28 NOTE — PROGRESS NOTES
[facility]      Name: Ranjith Peguero  Clinic Number: [id number]    Therapy Diagnosis:   Encounter Diagnoses   Name Primary?    Acute bilateral low back pain without sciatica     Weakness of trunk musculature     Decreased range of motion of lumbar spine      Physician: Sukumar Corbin MD    Visit Date: 2019    Physician Orders: eval and treat  Medical Diagnosis: chronic low back pain with right sciatica  Evaluation Date: 5/10/19  Authorization Period Expiration: 19  Reassessment Due: 19  Plan of Care Certification Period: 8/10/19 ( POC signed 5/10/19)  Visit #/Visits authorized:  (FOTO next visit)    Time In: 08  Time Out: 09  Total Billable Time: 48 minutes    Precautions: Standard    Pattern of pain determined: 1 PEN    Subjective     Ranjith reports that he is not in any pain. He is just stiff from work and yesterday's treatment session.    Patient reports tolerating previous visit good, no adverse reaction.  Patient reports their pain to be 0/10 on a 0-10 scale with 0 being no pain and 10 being the worst pain imaginable.  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  Pt goals: recreate with family and less pain after work    Objective      Baseline Isometric Testing on Med X equipment: Testing administered by PT  Date of testin/10/2019  ROM 0 to 48 deg   Max Peak Torque 125 ft.lbs   Min Peak Torque 76 ft.lbs   Flex/Ext Ratio 1.6   % below normative data 55%   Counter weight 179   femur 4   Seat pad 0      SFMA assessment 19 reveals:  Flexion test non painful dysfunctional due to reduced back ROM, and hamstrings tension  Extension test non painful dysfunctional due to reduced back ROM and trunk weakness    Outcomes:  Initial score:  41% impaired  Visit 5 score: next visit   Goal:    Treatment      Pt was instructed in and performed the following:     Ranjith received therapeutic exercises to develop/improved  posture, cardiovascular endurance, muscular endurance, lumbar ROM, strength and muscular endurance for 48 minutes including the following exercises:     Flexion in lie, reviewed particularly after walking/standing at work 10 reps   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 )  Ham stretching taught at stair, in chair and supine with strap for when he can work into his day  Calf stretching at stairs  Stretching calf and hams 10 sec 5 reps    LTR x 10 reps  DKTC x 10 reps  Open books x 10 reps   Ext in standing after sitting x 10 reps     [facility] 5/28/2019   Visit Number 5   VAS Pain Rating 0   Treadmill Time (in min.) 10   Speed 2.8   Extension in Standing 10   Flexion in Lying 10   Manual Therapy 0   Lumbar Weight 75   Repetitions 20   Rating of Perceived Exertion 4   Ice - Z Lie (in min.) 0     Peripheral muscle strengthening which included 1 set of 15-20 repetitions at a slow, controlled 10-13 second per rep pace focused on strengthening supporting musculature for improved body mechanics and functional mobility.  Pt and therapist focused on proper form during treatment to ensure optimal strengthening of each targeted muscle group.  Machines were utilized including torso rotation,  chest press, upright row, tricep extension, bicep curl.   leg extension, leg curl,leg press, and hip abduction and adduction.    Home Exercises Provided and Patient Education Provided   Flexion in lie  Ext in standing  LTR  Open books  Hamstring stretching (stair, chair and with strap all taught for convenience during day)    Education provided:   - lumbar roll, curve reversal, using stretches to manage back pain    Written Home Exercises Provided: yes.  Exercises were reviewed and Ranjith was able to demonstrate them prior to the end of the session.  Ranjith demonstrated good  understanding of the education provided.     See EMR under Patient Instructions for exercises provided 5/20/2019.    Assessment      Ranjith tolerated treatment session well today. MedX and global strengthening weight not progressed this session secondary to patient attending therapy yesterday. Patient able to complete 20 repetitions at 75 ft/lbs on MedX Lumbar Extension machine reporting appropriate LSP muscle response. Good tolerance to global strengthening. Continue progressing patient per [facility] protocol.    Pt will continue to benefit from skilled outpatient physical therapy to address the deficits stated in the impairment chart, provide pt/family education and to maximize pt's level of independence in the home and community environment.     Anticipated Barriers for therapy: none  Pt's spiritual, cultural and educational needs considered and pt agreeable to plan of care and goals as stated below:     Goals:   Short term goals:  6 weeks or 10 visits   1.  Pt will demonstrate increased lumbar ROM by at least 3 degrees from the initial ROM value with improvements noted in functional ROM and ability to perform ADLs.  Appropriate and ongoing  2.  Pt will demonstrate increased maximum isometric torque value by 30% when compared to the initial value resulting in improved ability to perform bending, lifting, and carrying activities safely, confidently.Appropriate and ongoing  3.  Patient report a reduction in worst pain score by 1-2 points for improved tolerance for work.Appropriate and ongoing  4.  Pt able to perform HEP correctly with minimal cueing or supervision from therapist to encourage independent management of symptoms. Appropriate and ongoing     Long term goals: 13 weeks or 20 visits   1. Pt will demonstrate increased lumbar ROM by at least 3 degrees from initial ROM value, resulting in improved ability to perform functional fwd bending while standing and sitting. Appropriate and ongoing  2. Pt will demonstrate increased maximum isometric torque value by 50% when compared to the initial value resulting in improved ability to  perform bending, lifting, and carrying activities safely, confidently.Appropriate and ongoing  3. Pt to demonstrate ability to independently control and reduce their pain through posture positioning and mechanical movements throughout a typical day.Appropriate and ongoing  4.  Patient will demonstrate improved overall function per FOTO Survey to CJ = at least 20% but < 40% impaired, limited or restricted score or less in order to show subjective improvement of condition.  Appropriate and ongoing  5. Pt will demonstrate independence with the HEP at discharge  Appropriate and ongoing  6.  Pt will be able to work without any lower back pain to improve quality of life.   Appropriate and ongoing     Plan     Continue with established Plan of Care towards established PT goals. Begin to address lifting ergonomics and carrying for work, request patient bring in mail bag to assess ergonomics carrying bag.    Progress MedX and peripheral resistance by 5% next visit.    Therapist: Nidia Sheets, PTA  05/28/2019

## 2019-06-10 DIAGNOSIS — G89.29 CHRONIC BILATERAL LOW BACK PAIN WITH RIGHT-SIDED SCIATICA: ICD-10-CM

## 2019-06-10 DIAGNOSIS — M54.41 CHRONIC BILATERAL LOW BACK PAIN WITH RIGHT-SIDED SCIATICA: ICD-10-CM

## 2019-06-10 RX ORDER — DICLOFENAC SODIUM 50 MG/1
50 TABLET, DELAYED RELEASE ORAL 2 TIMES DAILY
Qty: 60 TABLET | Refills: 0 | Status: SHIPPED | OUTPATIENT
Start: 2019-06-10 | End: 2019-09-03 | Stop reason: SDUPTHER

## 2019-06-13 ENCOUNTER — CLINICAL SUPPORT (OUTPATIENT)
Dept: REHABILITATION | Facility: HOSPITAL | Age: 44
End: 2019-06-13
Attending: FAMILY MEDICINE
Payer: COMMERCIAL

## 2019-06-13 DIAGNOSIS — M53.86 DECREASED RANGE OF MOTION OF LUMBAR SPINE: ICD-10-CM

## 2019-06-13 DIAGNOSIS — M62.81 WEAKNESS OF TRUNK MUSCULATURE: ICD-10-CM

## 2019-06-13 DIAGNOSIS — M54.50 ACUTE BILATERAL LOW BACK PAIN WITHOUT SCIATICA: ICD-10-CM

## 2019-06-13 PROCEDURE — 97110 THERAPEUTIC EXERCISES: CPT | Mod: PN

## 2019-06-13 NOTE — PROGRESS NOTES
[facility]      Name: Ranjith Peguero  Clinic Number: [id number]    Therapy Diagnosis:   Encounter Diagnoses   Name Primary?    Acute bilateral low back pain without sciatica     Weakness of trunk musculature     Decreased range of motion of lumbar spine      Physician: Sukumar Corbin MD    Visit Date: 2019    Physician Orders: eval and treat  Medical Diagnosis: chronic low back pain with right sciatica  Evaluation Date: 5/10/19  Authorization Period Expiration: 19  Reassessment Due: 19  Plan of Care Certification Period: 8/10/19 ( POC signed 5/10/19)  Visit #/Visits authorized:     Time In: 0800  Time Out: 0850  Total Billable Time: 50 minutes    Precautions: Standard    Pattern of pain determined: 1 PEN    Subjective     Ranjith reports that he is having some tightness in the Right low back. Patient notes that he worked yesterday so he thinks that may have contributed to the increase in pain.    Patient reports tolerating previous visit good, no adverse reaction.  Patient reports their pain to be 3/10 on a 0-10 scale with 0 being no pain and 10 being the worst pain imaginable.  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  Pt goals: recreate with family and less pain after work    Objective      Baseline Isometric Testing on Med X equipment: Testing administered by PT  Date of testin/10/2019  ROM 0 to 48 deg   Max Peak Torque 125 ft.lbs   Min Peak Torque 76 ft.lbs   Flex/Ext Ratio 1.6   % below normative data 55%   Counter weight 179   femur 4   Seat pad 0      SFMA assessment 19 reveals:  Flexion test non painful dysfunctional due to reduced back ROM, and hamstrings tension  Extension test non painful dysfunctional due to reduced back ROM and trunk weakness    Outcomes:  Initial score:  41% impaired  Visit 6 score: 33%  Goal:    Treatment      Pt was instructed in and performed the following:     Ranjith jordana  therapeutic exercises to develop/improved posture, cardiovascular endurance, muscular endurance, lumbar ROM, strength and muscular endurance for 50 minutes including the following exercises:     Flexion in lie, reviewed particularly after walking/standing at work 10 reps   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 )  Ham stretching taught at stair, in chair and supine with strap for when he can work into his day  Calf stretching at stairs  Stretching calf and hams 10 sec 5 reps    LTR x 10 reps  DKTC x 10 reps  Open books x 10 reps   Ext in standing after sitting x 10 reps     [facility] 6/13/2019   Visit Number 6   VAS Pain Rating 3   Treadmill Time (in min.) 10   Speed 1.7   Incline 0   Extension in Standing 10   Flexion in Lying 10   Manual Therapy 0   Lumbar Weight 78   Repetitions 20   Rating of Perceived Exertion 4   Ice - Z Lie (in min.) 0     Peripheral muscle strengthening which included 1 set of 15-20 repetitions at a slow, controlled 10-13 second per rep pace focused on strengthening supporting musculature for improved body mechanics and functional mobility.  Pt and therapist focused on proper form during treatment to ensure optimal strengthening of each targeted muscle group.  Machines were utilized including torso rotation,  chest press, upright row, tricep extension, bicep curl, leg extension, leg curl, leg press, and hip abduction and adduction.    Home Exercises Provided and Patient Education Provided   Flexion in lie  Ext in standing  LTR  Open books  Hamstring stretching (stair, chair and with strap all taught for convenience during day)    Education provided:   - lumbar roll, curve reversal, using stretches to manage back pain    Written Home Exercises Provided: yes.  Exercises were reviewed and Ranjith was able to demonstrate them prior to the end of the session.  Ranjith demonstrated good  understanding of the education provided.     See EMR under Patient  Instructions for exercises provided 5/20/2019.    Assessment     Ranjith tolerated treatment session well today. Good tolerance to progression of MedX Lumbar Extension weight to 78 ft/lbs completing 20 reps at an RPE of 4. Appropriate muscle response reported with peripheral machines. Continue progressing patient per [facility] protocol.     Pt will continue to benefit from skilled outpatient physical therapy to address the deficits stated in the impairment chart, provide pt/family education and to maximize pt's level of independence in the home and community environment.     Anticipated Barriers for therapy: none  Pt's spiritual, cultural and educational needs considered and pt agreeable to plan of care and goals as stated below:     Goals:   Short term goals:  6 weeks or 10 visits   1.  Pt will demonstrate increased lumbar ROM by at least 3 degrees from the initial ROM value with improvements noted in functional ROM and ability to perform ADLs.  Appropriate and ongoing  2.  Pt will demonstrate increased maximum isometric torque value by 30% when compared to the initial value resulting in improved ability to perform bending, lifting, and carrying activities safely, confidently.Appropriate and ongoing  3.  Patient report a reduction in worst pain score by 1-2 points for improved tolerance for work.Appropriate and ongoing  4.  Pt able to perform HEP correctly with minimal cueing or supervision from therapist to encourage independent management of symptoms. Appropriate and ongoing     Long term goals: 13 weeks or 20 visits   1. Pt will demonstrate increased lumbar ROM by at least 3 degrees from initial ROM value, resulting in improved ability to perform functional fwd bending while standing and sitting. Appropriate and ongoing  2. Pt will demonstrate increased maximum isometric torque value by 50% when compared to the initial value resulting in improved ability to perform bending, lifting, and carrying activities  safely, confidently.Appropriate and ongoing  3. Pt to demonstrate ability to independently control and reduce their pain through posture positioning and mechanical movements throughout a typical day.Appropriate and ongoing  4.  Patient will demonstrate improved overall function per FOTO Survey to CJ = at least 20% but < 40% impaired, limited or restricted score or less in order to show subjective improvement of condition.  Appropriate and ongoing  5. Pt will demonstrate independence with the HEP at discharge  Appropriate and ongoing  6.  Pt will be able to work without any lower back pain to improve quality of life.   Appropriate and ongoing     Plan     Continue with established Plan of Care towards established PT goals. Begin to address lifting ergonomics and carrying for work, request patient bring in mail bag to assess ergonomics carrying bag.    Progress MedX and peripheral resistance by 5% next visit.    Therapist: Nidia Sheets, PTA  06/13/2019

## 2019-06-24 DIAGNOSIS — M25.561 CHRONIC PAIN OF BOTH KNEES: Primary | ICD-10-CM

## 2019-06-24 DIAGNOSIS — G89.29 CHRONIC PAIN OF BOTH KNEES: Primary | ICD-10-CM

## 2019-06-24 DIAGNOSIS — M25.562 CHRONIC PAIN OF BOTH KNEES: Primary | ICD-10-CM

## 2019-06-25 ENCOUNTER — CLINICAL SUPPORT (OUTPATIENT)
Dept: REHABILITATION | Facility: HOSPITAL | Age: 44
End: 2019-06-25
Attending: FAMILY MEDICINE
Payer: COMMERCIAL

## 2019-06-25 DIAGNOSIS — M53.86 DECREASED RANGE OF MOTION OF LUMBAR SPINE: ICD-10-CM

## 2019-06-25 DIAGNOSIS — M62.81 WEAKNESS OF TRUNK MUSCULATURE: ICD-10-CM

## 2019-06-25 DIAGNOSIS — M54.50 ACUTE BILATERAL LOW BACK PAIN WITHOUT SCIATICA: ICD-10-CM

## 2019-06-25 PROCEDURE — 97110 THERAPEUTIC EXERCISES: CPT | Mod: PN

## 2019-06-25 NOTE — PROGRESS NOTES
[facility]      Name: Ranjith Peguero  Clinic Number: [id number]    Therapy Diagnosis:   Encounter Diagnoses   Name Primary?    Acute bilateral low back pain without sciatica     Weakness of trunk musculature     Decreased range of motion of lumbar spine      Physician: Sukumar Corbin MD    Visit Date: 2019    Physician Orders: eval and treat  Medical Diagnosis: chronic low back pain with right sciatica  Evaluation Date: 5/10/19  Authorization Period Expiration: 19  Reassessment Due: 19  Plan of Care Certification Period: 8/10/19 ( POC signed 5/10/19)  Visit #/Visits authorized:     Time In: 0800  Time Out: 0850  Total Billable Time: 40 minutes    Precautions: Standard    Pattern of pain determined: 1 PEN    Subjective     Ranjith reports going out of town on Thursday. Patient notes increased low back stiffness following his 5-6 hour drive even though he stopped to take frequent rest breaks. Patient states that he did his stretches when he got to his destination, however only had minimal pain relief. Patient notes improvement in symptoms come Saturday, but then had to drive home the next day. Patient reports that he ran out of his medicine and is picking up his refill today. Patient reports pain as a 6/10 at it's highest.    Patient reports tolerating previous visit good, no adverse reaction.  Patient reports their pain to be 6/10 on a 0-10 scale with 0 being no pain and 10 being the worst pain imaginable.  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  Pt goals: recreate with family and less pain after work    Objective      Baseline Isometric Testing on Med X equipment: Testing administered by PT  Date of testin/10/2019  ROM 0 to 48 deg   Max Peak Torque 125 ft.lbs   Min Peak Torque 76 ft.lbs   Flex/Ext Ratio 1.6   % below normative data 55%   Counter weight 179   femur 4   Seat pad 0      SFMA assessment 19  reveals:  Flexion test non painful dysfunctional due to reduced back ROM, and hamstrings tension  Extension test non painful dysfunctional due to reduced back ROM and trunk weakness    Outcomes:  Initial score:  41% impaired  Visit 6 score: 33%  Goal:    Treatment      Pt was instructed in and performed the following:     Ranjith received therapeutic exercises to develop/improved posture, cardiovascular endurance, muscular endurance, lumbar ROM, strength and muscular endurance for 40 minutes including the following exercises:     Flexion in lie, reviewed particularly after walking/standing at work 10 reps   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 )  Ham stretching taught at stair, in chair and supine with strap for when he can work into his day  Calf stretching at stairs  Stretching calf and hams 10 sec 5 reps    LTR x 10 reps  DKTC x 10 reps  Open books x 10 reps   Ext in standing after sitting x 10 reps     [facility] 6/25/2019   Visit Number 7   VAS Pain Rating 6   Treadmill Time (in min.) 10   Speed 1.5   Incline 0   Extension in Standing 10   Flexion in Lying 10   Manual Therapy 0   Lumbar Weight 78   Repetitions 15   Rating of Perceived Exertion 6   Ice - Z Lie (in min.) 10     Peripheral muscle strengthening which included 1 set of 15-20 repetitions at a slow, controlled 10-13 second per rep pace focused on strengthening supporting musculature for improved body mechanics and functional mobility.  Pt and therapist focused on proper form during treatment to ensure optimal strengthening of each targeted muscle group.  Machines were utilized including torso rotation,  chest press, upright row, tricep extension, bicep curl, leg extension, leg curl, leg press, and hip abduction and adduction.    Home Exercises Provided and Patient Education Provided   Flexion in lie  Ext in standing  LTR  Open books  Hamstring stretching (stair, chair and with strap all taught for convenience  during day)    Education provided:   - lumbar roll, curve reversal, using stretches to manage back pain    Written Home Exercises Provided: yes.  Exercises were reviewed and Ranjith was able to demonstrate them prior to the end of the session.  Ranjith demonstrated good  understanding of the education provided.     See EMR under Patient Instructions for exercises provided 5/20/2019.    Assessment     Fair tolerance to treatment session. Patient was only able to complete 15 repetitions at 78 ft/lbs on MedX reporting increased difficulty with stiffness throughout low back. Patient only able to complete 15 reps on all peripheral machines reporting appropriate muscle response and increased fatigue LSP fatigue. Weight lowered on leg press machine with improved tolerance.     Pt will continue to benefit from skilled outpatient physical therapy to address the deficits stated in the impairment chart, provide pt/family education and to maximize pt's level of independence in the home and community environment.     Anticipated Barriers for therapy: none  Pt's spiritual, cultural and educational needs considered and pt agreeable to plan of care and goals as stated below:     Goals:   Short term goals:  6 weeks or 10 visits   1.  Pt will demonstrate increased lumbar ROM by at least 3 degrees from the initial ROM value with improvements noted in functional ROM and ability to perform ADLs.  Appropriate and ongoing  2.  Pt will demonstrate increased maximum isometric torque value by 30% when compared to the initial value resulting in improved ability to perform bending, lifting, and carrying activities safely, confidently.Appropriate and ongoing  3.  Patient report a reduction in worst pain score by 1-2 points for improved tolerance for work.Appropriate and ongoing  4.  Pt able to perform HEP correctly with minimal cueing or supervision from therapist to encourage independent management of symptoms. Appropriate and ongoing     Long  term goals: 13 weeks or 20 visits   1. Pt will demonstrate increased lumbar ROM by at least 3 degrees from initial ROM value, resulting in improved ability to perform functional fwd bending while standing and sitting. Appropriate and ongoing  2. Pt will demonstrate increased maximum isometric torque value by 50% when compared to the initial value resulting in improved ability to perform bending, lifting, and carrying activities safely, confidently.Appropriate and ongoing  3. Pt to demonstrate ability to independently control and reduce their pain through posture positioning and mechanical movements throughout a typical day.Appropriate and ongoing  4.  Patient will demonstrate improved overall function per FOTO Survey to CJ = at least 20% but < 40% impaired, limited or restricted score or less in order to show subjective improvement of condition.  Appropriate and ongoing  5. Pt will demonstrate independence with the HEP at discharge  Appropriate and ongoing  6.  Pt will be able to work without any lower back pain to improve quality of life.   Appropriate and ongoing     Plan     Continue with established Plan of Care towards established PT goals. Begin to address lifting ergonomics and carrying for work, request patient bring in mail bag to assess ergonomics carrying bag.    Assess pain. Progress MedX and peripheral resistance by 5% next visit if able to tolerate.    Therapist: Nidia Sheets, PTA  06/25/2019

## 2019-06-27 ENCOUNTER — CLINICAL SUPPORT (OUTPATIENT)
Dept: REHABILITATION | Facility: HOSPITAL | Age: 44
End: 2019-06-27
Attending: FAMILY MEDICINE
Payer: COMMERCIAL

## 2019-06-27 DIAGNOSIS — M53.86 DECREASED RANGE OF MOTION OF LUMBAR SPINE: ICD-10-CM

## 2019-06-27 DIAGNOSIS — M54.50 ACUTE BILATERAL LOW BACK PAIN WITHOUT SCIATICA: ICD-10-CM

## 2019-06-27 DIAGNOSIS — M62.81 WEAKNESS OF TRUNK MUSCULATURE: ICD-10-CM

## 2019-06-27 PROCEDURE — 97110 THERAPEUTIC EXERCISES: CPT | Mod: PN

## 2019-06-27 NOTE — PROGRESS NOTES
[facility]      Name: Ranjith Peguero  Clinic Number: [id number]    Therapy Diagnosis:   Encounter Diagnoses   Name Primary?    Acute bilateral low back pain without sciatica     Weakness of trunk musculature     Decreased range of motion of lumbar spine      Physician: Sukumar Corbin MD    Visit Date: 2019    Physician Orders: eval and treat  Medical Diagnosis: chronic low back pain with right sciatica  Evaluation Date: 5/10/19  Authorization Period Expiration: 19  Reassessment Due: 19  Plan of Care Certification Period: 8/10/19 ( POC signed 5/10/19)  Visit #/Visits authorized:     Time In: 1030  Time Out: 1110  Total Billable Time: 40 minutes    Precautions: Standard    Pattern of pain determined: 1 PEN    Subjective     Ranjith reports that he feels better today than previous visit.    Patient reports tolerating previous visit good, no adverse reaction.  Patient reports their pain to be 6/10 on a 0-10 scale with 0 being no pain and 10 being the worst pain imaginable.  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  Pt goals: recreate with family and less pain after work    Objective      Baseline Isometric Testing on Med X equipment: Testing administered by PT  Date of testin/10/2019  ROM 0 to 48 deg   Max Peak Torque 125 ft.lbs   Min Peak Torque 76 ft.lbs   Flex/Ext Ratio 1.6   % below normative data 55%   Counter weight 179   femur 4   Seat pad 0      SFMA assessment 19 reveals:  Flexion test non painful dysfunctional due to reduced back ROM, and hamstrings tension  Extension test non painful dysfunctional due to reduced back ROM and trunk weakness    Outcomes:  Initial score:  41% impaired  Visit 6 score: 33%  Goal:    Treatment      Pt was instructed in and performed the following:     Ranjith received therapeutic exercises to develop/improved posture, cardiovascular endurance, muscular endurance, lumbar ROM,  strength and muscular endurance for 40 minutes including the following exercises:     Flexion in lie, reviewed particularly after walking/standing at work 10 reps   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 )  Ham stretching taught at stair, in chair and supine with strap for when he can work into his day  Calf stretching at stairs  Stretching calf and hams 10 sec 5 reps    LTR x 10 reps  DKTC x 10 reps  Open books x 10 reps   Ext in standing after sitting x 10 reps     [facility] 6/27/2019   Visit Number 8   VAS Pain Rating 0   Treadmill Time (in min.) 10   Speed 2   Incline 0   Extension in Standing 10   Flexion in Lying 10   Manual Therapy 0   Lumbar Weight 78   Repetitions 20   Rating of Perceived Exertion 3   Ice - Z Lie (in min.) 0     Peripheral muscle strengthening which included 1 set of 15-20 repetitions at a slow, controlled 10-13 second per rep pace focused on strengthening supporting musculature for improved body mechanics and functional mobility.  Pt and therapist focused on proper form during treatment to ensure optimal strengthening of each targeted muscle group.  Machines were utilized including torso rotation,  chest press, upright row, tricep extension, bicep curl, leg extension, leg curl, leg press, and hip abduction and adduction.    Home Exercises Provided and Patient Education Provided   Flexion in lie  Ext in standing  LTR  Open books  Hamstring stretching (stair, chair and with strap all taught for convenience during day)    Education provided:   - lumbar roll, curve reversal, using stretches to manage back pain    Written Home Exercises Provided: yes.  Exercises were reviewed and Ranjith was able to demonstrate them prior to the end of the session.  Ranjith demonstrated good  understanding of the education provided.     See EMR under Patient Instructions for exercises provided 5/20/2019.    Assessment     Good tolerance to treatment session today. Patient  with improved tolerance to MedX Lumbar Extension machine completing 20 reps at 78 ft/lbs with an RPE of 3. Pt will continue to benefit from skilled outpatient physical therapy to address the deficits stated in the impairment chart, provide pt/family education and to maximize pt's level of independence in the home and community environment.     Anticipated Barriers for therapy: none  Pt's spiritual, cultural and educational needs considered and pt agreeable to plan of care and goals as stated below:     Goals:   Short term goals:  6 weeks or 10 visits   1.  Pt will demonstrate increased lumbar ROM by at least 3 degrees from the initial ROM value with improvements noted in functional ROM and ability to perform ADLs.  Appropriate and ongoing  2.  Pt will demonstrate increased maximum isometric torque value by 30% when compared to the initial value resulting in improved ability to perform bending, lifting, and carrying activities safely, confidently.Appropriate and ongoing  3.  Patient report a reduction in worst pain score by 1-2 points for improved tolerance for work.Appropriate and ongoing  4.  Pt able to perform HEP correctly with minimal cueing or supervision from therapist to encourage independent management of symptoms. Appropriate and ongoing     Long term goals: 13 weeks or 20 visits   1. Pt will demonstrate increased lumbar ROM by at least 3 degrees from initial ROM value, resulting in improved ability to perform functional fwd bending while standing and sitting. Appropriate and ongoing  2. Pt will demonstrate increased maximum isometric torque value by 50% when compared to the initial value resulting in improved ability to perform bending, lifting, and carrying activities safely, confidently.Appropriate and ongoing  3. Pt to demonstrate ability to independently control and reduce their pain through posture positioning and mechanical movements throughout a typical day.Appropriate and ongoing  4.  Patient will  demonstrate improved overall function per FOTO Survey to CJ = at least 20% but < 40% impaired, limited or restricted score or less in order to show subjective improvement of condition.  Appropriate and ongoing  5. Pt will demonstrate independence with the HEP at discharge  Appropriate and ongoing  6.  Pt will be able to work without any lower back pain to improve quality of life.   Appropriate and ongoing     Plan     Continue with established Plan of Care towards established PT goals. Begin to address lifting ergonomics and carrying for work, request patient bring in mail bag to assess ergonomics carrying bag.    Assess pain. Progress MedX and peripheral resistance by 5% next visit if able to tolerate.    Therapist: Nidia Sheets, PTA  06/27/2019

## 2019-06-28 ENCOUNTER — OFFICE VISIT (OUTPATIENT)
Dept: FAMILY MEDICINE | Facility: CLINIC | Age: 44
End: 2019-06-28
Payer: COMMERCIAL

## 2019-06-28 VITALS
BODY MASS INDEX: 31.2 KG/M2 | OXYGEN SATURATION: 99 % | HEART RATE: 99 BPM | TEMPERATURE: 99 F | SYSTOLIC BLOOD PRESSURE: 129 MMHG | HEIGHT: 71 IN | RESPIRATION RATE: 17 BRPM | WEIGHT: 222.88 LBS | DIASTOLIC BLOOD PRESSURE: 81 MMHG

## 2019-06-28 DIAGNOSIS — M25.562 PAIN IN BOTH KNEES, UNSPECIFIED CHRONICITY: Primary | ICD-10-CM

## 2019-06-28 DIAGNOSIS — R21 RASH AND NONSPECIFIC SKIN ERUPTION: Primary | ICD-10-CM

## 2019-06-28 DIAGNOSIS — M25.561 PAIN IN BOTH KNEES, UNSPECIFIED CHRONICITY: Primary | ICD-10-CM

## 2019-06-28 PROCEDURE — 3074F PR MOST RECENT SYSTOLIC BLOOD PRESSURE < 130 MM HG: ICD-10-PCS | Mod: CPTII,S$GLB,, | Performed by: FAMILY MEDICINE

## 2019-06-28 PROCEDURE — 99999 PR PBB SHADOW E&M-EST. PATIENT-LVL III: CPT | Mod: PBBFAC,,, | Performed by: FAMILY MEDICINE

## 2019-06-28 PROCEDURE — 3008F BODY MASS INDEX DOCD: CPT | Mod: CPTII,S$GLB,, | Performed by: FAMILY MEDICINE

## 2019-06-28 PROCEDURE — 3079F PR MOST RECENT DIASTOLIC BLOOD PRESSURE 80-89 MM HG: ICD-10-PCS | Mod: CPTII,S$GLB,, | Performed by: FAMILY MEDICINE

## 2019-06-28 PROCEDURE — 99214 OFFICE O/P EST MOD 30 MIN: CPT | Mod: S$GLB,,, | Performed by: FAMILY MEDICINE

## 2019-06-28 PROCEDURE — 99999 PR PBB SHADOW E&M-EST. PATIENT-LVL III: ICD-10-PCS | Mod: PBBFAC,,, | Performed by: FAMILY MEDICINE

## 2019-06-28 PROCEDURE — 3008F PR BODY MASS INDEX (BMI) DOCUMENTED: ICD-10-PCS | Mod: CPTII,S$GLB,, | Performed by: FAMILY MEDICINE

## 2019-06-28 PROCEDURE — 3079F DIAST BP 80-89 MM HG: CPT | Mod: CPTII,S$GLB,, | Performed by: FAMILY MEDICINE

## 2019-06-28 PROCEDURE — 3074F SYST BP LT 130 MM HG: CPT | Mod: CPTII,S$GLB,, | Performed by: FAMILY MEDICINE

## 2019-06-28 PROCEDURE — 99214 PR OFFICE/OUTPT VISIT, EST, LEVL IV, 30-39 MIN: ICD-10-PCS | Mod: S$GLB,,, | Performed by: FAMILY MEDICINE

## 2019-06-28 RX ORDER — MUPIROCIN 20 MG/G
OINTMENT TOPICAL 3 TIMES DAILY
Qty: 30 G | Refills: 0 | Status: SHIPPED | OUTPATIENT
Start: 2019-06-28 | End: 2019-10-03

## 2019-06-28 RX ORDER — DOXYCYCLINE 100 MG/1
100 CAPSULE ORAL EVERY 12 HOURS
Qty: 20 CAPSULE | Refills: 0 | Status: SHIPPED | OUTPATIENT
Start: 2019-06-28 | End: 2019-07-08

## 2019-06-28 NOTE — PROGRESS NOTES
Routine Office Visit    Patient Name: Ranjith Peguero    : 1975  MRN: [id number]    Subjective:  Ranjith is a 43 y.o. male who presents today for:    1. Rash  Patient presenting today with rash to torso after being bitten by an unknown insect while in [place].  He states that he and his family were outside and got bitten my multiple bugs.  When he came woke up the next morning, he had multiple scab areas on his torso, back, and chest.  There was never any drainage.  No redness or pain on palpation.  There has been no redness around the effected areas.  He states that the insect he saw on his chest looked like a rodriguez.      Past Medical History  Past Medical History:   Diagnosis Date    Gout     L big toe    Hyperlipidemia     Hypertension        Past Surgical History  History reviewed. No pertinent surgical history.    Family History  Family History   Problem Relation Age of Onset    Hypertension Mother     Hypertension Father        Social History  Social History     Socioeconomic History    Marital status:      Spouse name: Not on file    Number of children: Not on file    Years of education: Not on file    Highest education level: Not on file   Occupational History    Not on file   Social Needs    Financial resource strain: Not on file    Food insecurity:     Worry: Not on file     Inability: Not on file    Transportation needs:     Medical: Not on file     Non-medical: Not on file   Tobacco Use    Smoking status: Never Smoker    Smokeless tobacco: Never Used   Substance and Sexual Activity    Alcohol use: Yes    Drug use: No    Sexual activity: Yes     Partners: Female   Lifestyle    Physical activity:     Days per week: Not on file     Minutes per session: Not on file    Stress: Not on file   Relationships    Social connections:     Talks on phone: Not on file     Gets together: Not on file     Attends [employer] service: Not on file     Active member of club or organization: Not  "on file     Attends meetings of clubs or organizations: Not on file     Relationship status: Not on file   Other Topics Concern    Not on file   Social History Narrative    [employer] [facility] [employer]  .  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         Current Medications  Current Outpatient Medications on File Prior to Visit   Medication Sig Dispense Refill    amlodipine-benazepril 5-20 mg (LOTREL) 5-20 mg per capsule Take 1 capsule by mouth once daily. 90 capsule 1    atorvastatin (LIPITOR) 10 MG tablet TAKE 1 TABLET BY MOUTH ONCE DAILY 90 tablet 0    baclofen (LIORESAL) 10 MG tablet Take 1 tablet (10 mg total) by mouth every evening. 30 tablet 1    colchicine (COLCRYS) 0.6 mg tablet TAKE 2 TABLETS BY MOUTH AT FIRST SIGN OF FLARE, FOLLOWED IN ONE HOUR WITH 1 TABLET; MAX OF 3 TABLETS WITHIN ONE HOUR 30 tablet 0    diclofenac (VOLTAREN) 50 MG EC tablet Take 1 tablet (50 mg total) by mouth 2 (two) times daily. 60 tablet 0    hydrOXYzine HCl (ATARAX) 25 MG tablet Take 1 tablet (25 mg total) by mouth every 12 (twelve) hours as needed for Itching. 20 tablet 0    triamcinolone acetonide 0.1% (KENALOG) 0.1 % cream IRMA AA ON THE SKIN QD TO BID  1    FLUVIRIN [id number] 45 mcg (15 mcg x 3)/0.5 mL Susp       [DISCONTINUED] atorvastatin (LIPITOR) 10 MG tablet TAKE 1 TABLET BY MOUTH ONCE DAILY 90 tablet 0     No current facility-administered medications on file prior to visit.        Allergies   Review of patient's allergies indicates:  No Known Allergies    Review of Systems (Pertinent positives)  Review of Systems   Constitutional: Negative.    HENT: Negative.    Respiratory: Negative.    Cardiovascular: Negative.    Genitourinary: Negative.    Musculoskeletal: Negative.    Skin: Positive for rash.         /81 (BP Location: Left arm, Patient Position: Sitting, BP Method: Large (Automatic))   Pulse 99   Temp 98.6 °F (37 °C) (Oral)   Resp 17   Ht 5' 10.98" (1.803 m)   Wt 101.1 kg (222 lb 14.2 oz)   SpO2 99%   BMI 31.10 kg/m² "     GENERAL APPEARANCE: in no apparent distress and well developed and well nourished  HEENT: PERRL, EOMI, Sclera clear, anicteric, Oropharynx clear, no lesions, Neck supple with midline trachea  NECK: normal, supple, no adenopathy, thyroid normal in size  RESPIRATORY: appears well, vitals normal, no respiratory distress, acyanotic, normal RR, chest clear, no wheezing, crepitations, rhonchi, normal symmetric air entry  HEART: regular rate and rhythm, S1, S2 normal, no murmur, click, rub or gallop.    ABDOMEN: abdomen is soft without tenderness, no masses, no hernias, no organomegaly, no rebound, no guarding. Suprapubic tenderness absent. No CVA tenderness.  SKIN: multiple scabs to chest, abdomen, and lower back  PSYCH: Alert, oriented x 3, thought content appropriate, speech normal, pleasant and cooperative, good eye contact, well groomed    Assessment/Plan:  Ranjith Peguero is a 43 y.o. male who presents today for :    Ranjith was seen today for rash.    Diagnoses and all orders for this visit:    Rash and nonspecific skin eruption  -     mupirocin (BACTROBAN) 2 % ointment; Apply topically 3 (three) times daily.  -     doxycycline (VIBRAMYCIN) 100 MG Cap; Take 1 capsule (100 mg total) by mouth every 12 (twelve) hours. for 10 days      1.  Doxycycline should lesions worsen  2.  Topical bactroban for skin lesions  3.  Follow up as needed      Sukumar Corbin MD

## 2019-07-01 ENCOUNTER — CLINICAL SUPPORT (OUTPATIENT)
Dept: REHABILITATION | Facility: HOSPITAL | Age: 44
End: 2019-07-01
Attending: FAMILY MEDICINE
Payer: COMMERCIAL

## 2019-07-01 ENCOUNTER — OFFICE VISIT (OUTPATIENT)
Dept: ORTHOPEDICS | Facility: CLINIC | Age: 44
End: 2019-07-01
Payer: COMMERCIAL

## 2019-07-01 VITALS
HEIGHT: 70 IN | BODY MASS INDEX: 32.48 KG/M2 | HEART RATE: 87 BPM | TEMPERATURE: 98 F | DIASTOLIC BLOOD PRESSURE: 80 MMHG | SYSTOLIC BLOOD PRESSURE: 116 MMHG | WEIGHT: 226.88 LBS

## 2019-07-01 DIAGNOSIS — M22.2X2 PATELLOFEMORAL PAIN SYNDROME OF BOTH KNEES: Primary | ICD-10-CM

## 2019-07-01 DIAGNOSIS — M62.81 WEAKNESS OF TRUNK MUSCULATURE: ICD-10-CM

## 2019-07-01 DIAGNOSIS — M54.50 ACUTE BILATERAL LOW BACK PAIN WITHOUT SCIATICA: ICD-10-CM

## 2019-07-01 DIAGNOSIS — M53.86 DECREASED RANGE OF MOTION OF LUMBAR SPINE: ICD-10-CM

## 2019-07-01 DIAGNOSIS — M22.2X1 PATELLOFEMORAL PAIN SYNDROME OF BOTH KNEES: Primary | ICD-10-CM

## 2019-07-01 PROCEDURE — 3074F SYST BP LT 130 MM HG: CPT | Mod: CPTII,S$GLB,, | Performed by: PHYSICIAN ASSISTANT

## 2019-07-01 PROCEDURE — 99203 PR OFFICE/OUTPT VISIT, NEW, LEVL III, 30-44 MIN: ICD-10-PCS | Mod: S$GLB,,, | Performed by: PHYSICIAN ASSISTANT

## 2019-07-01 PROCEDURE — 3079F PR MOST RECENT DIASTOLIC BLOOD PRESSURE 80-89 MM HG: ICD-10-PCS | Mod: CPTII,S$GLB,, | Performed by: PHYSICIAN ASSISTANT

## 2019-07-01 PROCEDURE — 3008F BODY MASS INDEX DOCD: CPT | Mod: CPTII,S$GLB,, | Performed by: PHYSICIAN ASSISTANT

## 2019-07-01 PROCEDURE — 3079F DIAST BP 80-89 MM HG: CPT | Mod: CPTII,S$GLB,, | Performed by: PHYSICIAN ASSISTANT

## 2019-07-01 PROCEDURE — 99999 PR PBB SHADOW E&M-EST. PATIENT-LVL III: ICD-10-PCS | Mod: PBBFAC,,, | Performed by: PHYSICIAN ASSISTANT

## 2019-07-01 PROCEDURE — 3008F PR BODY MASS INDEX (BMI) DOCUMENTED: ICD-10-PCS | Mod: CPTII,S$GLB,, | Performed by: PHYSICIAN ASSISTANT

## 2019-07-01 PROCEDURE — 99203 OFFICE O/P NEW LOW 30 MIN: CPT | Mod: S$GLB,,, | Performed by: PHYSICIAN ASSISTANT

## 2019-07-01 PROCEDURE — 97110 THERAPEUTIC EXERCISES: CPT | Mod: PN

## 2019-07-01 PROCEDURE — 3074F PR MOST RECENT SYSTOLIC BLOOD PRESSURE < 130 MM HG: ICD-10-PCS | Mod: CPTII,S$GLB,, | Performed by: PHYSICIAN ASSISTANT

## 2019-07-01 PROCEDURE — 99999 PR PBB SHADOW E&M-EST. PATIENT-LVL III: CPT | Mod: PBBFAC,,, | Performed by: PHYSICIAN ASSISTANT

## 2019-07-01 NOTE — PROGRESS NOTES
SUBJECTIVE:     Chief Complaint   Patient presents with    Left Knee - Pain       History of Present Illness:  Ranjith Peguero is a 43 y.o. year old male here with a history of constant bilateral knee pain which started several years ago.  There is not a history of trauma.  The pain is located in the anterior aspect of the knee.  The pain is described as achy, 3/10.  There is not radiation.  There is not catching or locking.  Aggravating factors include going up and down stairs, kneeling, squatting, walking and prolonged standing.  Associated symptoms include knee giving out.  There is not numbness or tingling of the lower extremity.  There is not back pain.  He works as a .  He is a disabled .  [facility]. He takes nsaids otc and recently began taking diclofenac for for his back, which has helped the pain.  There is not a history of previous injury or surgery to the knee.  The patient does not use an assistive device.    Review of patient's allergies indicates:  No Known Allergies      Current Outpatient Medications   Medication Sig Dispense Refill    amlodipine-benazepril 5-20 mg (LOTREL) 5-20 mg per capsule Take 1 capsule by mouth once daily. 90 capsule 1    atorvastatin (LIPITOR) 10 MG tablet TAKE 1 TABLET BY MOUTH ONCE DAILY 90 tablet 0    baclofen (LIORESAL) 10 MG tablet Take 1 tablet (10 mg total) by mouth every evening. 30 tablet 1    colchicine (COLCRYS) 0.6 mg tablet TAKE 2 TABLETS BY MOUTH AT FIRST SIGN OF FLARE, FOLLOWED IN ONE HOUR WITH 1 TABLET; MAX OF 3 TABLETS WITHIN ONE HOUR 30 tablet 0    diclofenac (VOLTAREN) 50 MG EC tablet Take 1 tablet (50 mg total) by mouth 2 (two) times daily. 60 tablet 0    doxycycline (VIBRAMYCIN) 100 MG Cap Take 1 capsule (100 mg total) by mouth every 12 (twelve) hours. for 10 days 20 capsule 0    FLUVIRIN [id number] 45 mcg (15 mcg x 3)/0.5 mL Susp       hydrOXYzine HCl (ATARAX) 25 MG tablet Take 1 tablet  "(25 mg total) by mouth every 12 (twelve) hours as needed for Itching. 20 tablet 0    mupirocin (BACTROBAN) 2 % ointment Apply topically 3 (three) times daily. 30 g 0    triamcinolone acetonide 0.1% (KENALOG) 0.1 % cream IRMA AA ON THE SKIN QD TO BID  1     No current facility-administered medications for this visit.        Past Medical History:   Diagnosis Date    Gout     L big toe    Hyperlipidemia     Hypertension        History reviewed. No pertinent surgical history.    Vital Signs (Most Recent)  Vitals:    07/01/19 1334   BP: 116/80   Pulse: 87   Temp: 98.4 °F (36.9 °C)           Review of Systems:  ROS:  Constitutional: no fever or chills  Eyes: no visual changes  ENT: no nasal congestion or sore throat  Respiratory: no cough or shortness of breath  Cardiovascular: no chest pain or palpitations  Gastrointestinal: no nausea or vomiting, tolerating diet  Genitourinary: no hematuria or dysuria  Integument/Breast: no rash or pruritis  Hematologic/Lymphatic: no easy bruising or lymphadenopathy  Musculoskeletal: no arthralgias or myalgias  Neurological: no seizures or tremors  Behavioral/Psych: no auditory or visual hallucinations  Endocrine: no heat or cold intolerance        OBJECTIVE:     PHYSICAL EXAM:  Height: 5' 10" (177.8 cm) Weight: 102.9 kg (226 lb 13.7 oz)   General: Well developed, well nourished, in no acute distress.  Neurological: Mood & affect are normal.  HEENT: NCAT, sclera nonicteric   Lungs: Respirations are equal and unlabored.   CV: 2+ bilateral upper and lower extremity pulses.   Skin: Intact throughout with no rashes, erythema, or lesions  Extremities: No LE edema, no erythema or warmth of the skin in either lower extremity.    right  Knee Exam:  Knee Range of Motion: 0-100   Effusion: none  Condition of skin: intact  Location of tenderness:Patellar tendon and quad tendon insertion   Strength:5 of 5 quadriceps strength and 5/5 hamstring  Stability:  Lachman: stable and PCL: stable  Varus " /Valgus stress: normal  Ihsan: negative    left  Knee Exam:  Knee Range of Motion:0-110   Effusion:none  Condition of skin:intact  Location of tenderness: quad tendon insertion   Strength:5 of 5 quadriceps strength and 5 of 5 hamstring strength  Stability: Lachman: stable and PCL: stable  Varus /Valgus stress: normal  Ihsan:  negative    IMAGING:    Xrays of the bilateral knee, reviewed by me, show no fracture or dislocation.  There is well maintained joint space.      ASSESSMENT/PLAN:   43 y.o. year old male with bilateral knee patellofemoral pain, quadriceps tendonitis    - Continue nsaids  - Home exercises.  He is currently in PT for his back.  I think that he would benefit from a formal therapy program for his knees.  - Follow up as needed.  [facility] and [facility].

## 2019-07-01 NOTE — PROGRESS NOTES
[facility]      Name: Ranjith Peguero  Clinic Number: [id number]    Therapy Diagnosis:   Encounter Diagnoses   Name Primary?    Acute bilateral low back pain without sciatica     Weakness of trunk musculature     Decreased range of motion of lumbar spine      Physician: Sukumar Corbin MD    Visit Date: 2019    Physician Orders: eval and treat  Medical Diagnosis: chronic low back pain with right sciatica  Evaluation Date: 5/10/19  Authorization Period Expiration: 19  Reassessment Due: 19  Plan of Care Certification Period: 8/10/19 ( POC signed 5/10/19)  Visit #/Visits authorized:     Time In: 0800  Time Out: 08  Total Billable Time: 38 minutes    Precautions: Standard    Pattern of pain determined: 1 PEN    Subjective     Ranjith reports that he feels good today and is pain free. Patient states that he would like     Patient reports tolerating previous visit good, no adverse reaction.  Patient reports their pain to be 6/10 on a 0-10 scale with 0 being no pain and 10 being the worst pain imaginable.  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  Pt goals: recreate with family and less pain after work    Objective      Baseline Isometric Testing on Med X equipment: Testing administered by PT  Date of testin/10/2019  ROM 0 to 48 deg   Max Peak Torque 125 ft.lbs   Min Peak Torque 76 ft.lbs   Flex/Ext Ratio 1.6   % below normative data 55%   Counter weight 179   femur 4   Seat pad 0      SFMA assessment 19 reveals:  Flexion test non painful dysfunctional due to reduced back ROM, and hamstrings tension  Extension test non painful dysfunctional due to reduced back ROM and trunk weakness    Outcomes:  Initial score:  41% impaired  Visit 6 score: 33%  Goal:    Treatment      Pt was instructed in and performed the following:     Ranjith received therapeutic exercises to develop/improved posture, cardiovascular endurance, muscular  endurance, lumbar ROM, strength and muscular endurance for 38 minutes including the following exercises:     Flexion in lie, reviewed particularly after walking/standing at work 10 reps   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 )  Ham stretching taught at stair, in chair and supine with strap for when he can work into his day  Calf stretching at stairs  Stretching calf and hams 10 sec 5 reps    LTR x 10 reps  DKTC x 10 reps  Open books x 10 reps   Ext in standing after sitting x 10 reps     [facility] 7/1/2019   Visit Number 9   VAS Pain Rating 0   Treadmill Time (in min.) 10   Speed 2.2   Incline 0   Extension in Standing 10   Flexion in Lying 10   Manual Therapy 0   Lumbar Weight 81   Repetitions 17   Rating of Perceived Exertion 4   Ice - Z Lie (in min.) 0     Peripheral muscle strengthening which included 1 set of 15-20 repetitions at a slow, controlled 10-13 second per rep pace focused on strengthening supporting musculature for improved body mechanics and functional mobility.  Pt and therapist focused on proper form during treatment to ensure optimal strengthening of each targeted muscle group.  Machines were utilized including torso rotation,  chest press, upright row, tricep extension, bicep curl, leg extension, leg curl, leg press, and hip abduction and adduction.    Home Exercises Provided and Patient Education Provided   Flexion in lie  Ext in standing  LTR  Open books  Hamstring stretching (stair, chair and with strap all taught for convenience during day)    Education provided:   - lumbar roll, curve reversal, using stretches to manage back pain    Written Home Exercises Provided: yes.  Exercises were reviewed and Ranjith was able to demonstrate them prior to the end of the session.  Ranjith demonstrated good  understanding of the education provided.     See EMR under Patient Instructions for exercises provided 5/20/2019.    Assessment     Patient with very good  tolerance to treatment session. Weight increased to 81 ft/lbs completing 17 repetitions at an RPE of 3-4 reporting appropriate lumbar extensor muscle fatigue. Patient able to complete all peripheral machines for peripheral and global strengthening noting no exacerbation of low back pain. Consider discharge next visit per pt request.    Anticipated Barriers for therapy: none  Pt's spiritual, cultural and educational needs considered and pt agreeable to plan of care and goals as stated below:     Goals:   Short term goals:  6 weeks or 10 visits   1.  Pt will demonstrate increased lumbar ROM by at least 3 degrees from the initial ROM value with improvements noted in functional ROM and ability to perform ADLs.  Appropriate and ongoing  2.  Pt will demonstrate increased maximum isometric torque value by 30% when compared to the initial value resulting in improved ability to perform bending, lifting, and carrying activities safely, confidently.Appropriate and ongoing  3.  Patient report a reduction in worst pain score by 1-2 points for improved tolerance for work.Appropriate and ongoing  4.  Pt able to perform HEP correctly with minimal cueing or supervision from therapist to encourage independent management of symptoms. Appropriate and ongoing     Long term goals: 13 weeks or 20 visits   1. Pt will demonstrate increased lumbar ROM by at least 3 degrees from initial ROM value, resulting in improved ability to perform functional fwd bending while standing and sitting. Appropriate and ongoing  2. Pt will demonstrate increased maximum isometric torque value by 50% when compared to the initial value resulting in improved ability to perform bending, lifting, and carrying activities safely, confidently.Appropriate and ongoing  3. Pt to demonstrate ability to independently control and reduce their pain through posture positioning and mechanical movements throughout a typical day.Appropriate and ongoing  4.  Patient will  demonstrate improved overall function per FOTO Survey to CJ = at least 20% but < 40% impaired, limited or restricted score or less in order to show subjective improvement of condition.  Appropriate and ongoing  5. Pt will demonstrate independence with the HEP at discharge  Appropriate and ongoing  6.  Pt will be able to work without any lower back pain to improve quality of life.   Appropriate and ongoing     Plan     Continue with established Plan of Care towards established PT goals. Begin to address lifting ergonomics and carrying for work, request patient bring in mail bag to assess ergonomics carrying bag.    Consider discharge next visit.    Therapist: Nidia Sheets, BLADE  07/01/2019

## 2019-07-01 NOTE — LETTER
July 1, 2019      Sukumar Corbin MD  [address] [place] [address]           [facility]  [address], [address]  [place] [address]  Phone: [phone]          Patient: Ranjith Peguero   MR Number: [id number]   YOB: 1975   Date of Visit: 7/1/2019       Dear Dr. Sukumar SAUCEDO Page:    Thank you for referring Ranjith Peguero to me for evaluation. Attached you will find relevant portions of my assessment and plan of care.    If you have questions, please do not hesitate to call me. I look forward to following Ranjith Peguero along with you.    Sincerely,    Yamilex Pedraza PA-C    Enclosure  CC:  No Recipients    If you would like to receive this communication electronically, please contact [email or web address] or [phone] to request more information on [facility] access.    For providers and/or their staff who would like to refer a patient to [facility], please contact us through our one-stop-shop provider referral line, [facility], at [phone].    If you feel you have received this communication in error or would no longer like to receive these types of communications, please e-mail [email or web address]

## 2019-07-08 ENCOUNTER — TELEPHONE (OUTPATIENT)
Dept: ORTHOPEDICS | Facility: CLINIC | Age: 44
End: 2019-07-08

## 2019-07-09 ENCOUNTER — CLINICAL SUPPORT (OUTPATIENT)
Dept: REHABILITATION | Facility: HOSPITAL | Age: 44
End: 2019-07-09
Attending: FAMILY MEDICINE
Payer: COMMERCIAL

## 2019-07-09 DIAGNOSIS — M54.50 ACUTE BILATERAL LOW BACK PAIN WITHOUT SCIATICA: ICD-10-CM

## 2019-07-09 DIAGNOSIS — M62.81 WEAKNESS OF TRUNK MUSCULATURE: ICD-10-CM

## 2019-07-09 DIAGNOSIS — M53.86 DECREASED RANGE OF MOTION OF LUMBAR SPINE: ICD-10-CM

## 2019-07-09 PROCEDURE — 97750 PHYSICAL PERFORMANCE TEST: CPT | Mod: PN

## 2019-07-09 PROCEDURE — 97110 THERAPEUTIC EXERCISES: CPT | Mod: PN

## 2019-07-09 NOTE — PROGRESS NOTES
[facility]      Name: Ranjith Peguero  Clinic Number: [id number]    Therapy Diagnosis:   Encounter Diagnoses   Name Primary?    Acute bilateral low back pain without sciatica     Weakness of trunk musculature     Decreased range of motion of lumbar spine      Physician: Sukumar Corbin MD    Visit Date: 2019    Physician Orders: eval and treat  Medical Diagnosis: chronic low back pain with right sciatica  Evaluation Date: 5/10/19  Authorization Period Expiration: 19  Reassessment Due: 19  Plan of Care Certification Period: 8/10/19 ( POC signed 5/10/19)  Visit #/Visits authorized: 10 / 20    Time In: 9:30 am  Time Out: 10:10 am  Total Billable Time: 30 minutes    Precautions: Standard    Pattern of pain determined: 1 PEN    Subjective     Ranjith reports that he had harsh week of work. Pt states lower back pain has been bad since Saturday. Pt states he wants to be d/c today.     Patient reports tolerating previous visit good, no adverse reaction.  Patient reports their pain to be 6/10 on a 0-10 scale with 0 being no pain and 10 being the worst pain imaginable.  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  Pt goals: recreate with family and less pain after work    Objective      Baseline Isometric Testing on Med X equipment: Testing administered by PT  Date of testin/10/2019  ROM 0 to 48 deg   Max Peak Torque 125 ft.lbs   Min Peak Torque 76 ft.lbs   Flex/Ext Ratio 1.6   % below normative data 55%   Counter weight 179   femur 4   Seat pad 0      D/c Isometric Testing on Med X equipment: Testing administered by PT  Date of testin19  ROM 0 to 48 deg   Max Peak Torque 291 ft.lbs   Min Peak Torque 143 ft.lbs   Flex/Ext Ratio 1.6   % below normative data 7%   Counter weight 179   femur 4   Seat pad 0   Test Percent Gain in Strength from Initial  109%        SFMA assessment 19 reveals:  Flexion test non painful dysfunctional due  to reduced back ROM, and hamstrings tension  Extension test non painful dysfunctional due to reduced back ROM and trunk weakness    CMS Impairment/Limitation/Restriction for FOTO Lumbar Spine Survey  Status Limitation G-Code CMS Severity Modifier  Intake 59% 41%  Predicted 71% 29% Goal Status+ CJ - At least 20 percent but less than 40 percent  6/13/2019 67% 33%  7/9/2019 56% 44% Current Status CK - At least 40 percent but less than 60 percent  D/C Status CK **only report if this is discharge survey    Treatment      Pt was instructed in and performed the following:     Ranjith received therapeutic exercises to develop/improved posture, cardiovascular endurance, muscular endurance, lumbar ROM, strength and muscular endurance for 30 minutes including the following exercises:     Flexion in lie, reviewed particularly after walking/standing at work 10 reps   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 )  Ham stretching taught at stair, in chair and supine with strap for when he can work into his day  Calf stretching at stairs  Stretching calf and hams 10 sec 5 reps    LTR x 10 reps  DKTC x 10 reps  Open books x 10 reps   Ext in standing after sitting x 10 reps       [facility] 7/9/2019   Visit Number 10   VAS Pain Rating 0   Treadmill Time (in min.) 5   Speed 2.2   Incline 0   Extension in Standing -   Flexion in Lying -   Manual Therapy -   Lumbar Extension Seat Pad 0   Femur Restraint 4   Top Dead Center 24   Counterweight 179   Lumbar Flexion 48   Lumbar Extension 0   Lumbar Peak Torque 291   Min Torque 143   Test Percent Below Normative Data 7   Test Percent Gain in Strength from Initial  109   Lumbar Weight -   Repetitions -   Rating of Perceived Exertion -   Ice - Z Lie (in min.) 15       Peripheral muscle strengthening which included 1 set of 15-20 repetitions at a slow, controlled 10-13 second per rep pace focused on strengthening supporting musculature for improved body  mechanics and functional mobility.  Pt and therapist focused on proper form during treatment to ensure optimal strengthening of each targeted muscle group.  Machines were utilized including torso rotation,  chest press, upright row, tricep extension, bicep curl, leg extension, leg curl, leg press, and hip abduction and adduction.    Home Exercises Provided and Patient Education Provided   Flexion in lie  Ext in standing  LTR  Open books  Hamstring stretching (stair, chair and with strap all taught for convenience during day)    Education provided:   - lumbar roll, curve reversal, using stretches to manage back pain    Written Home Exercises Provided: yes.  Exercises were reviewed and Ranjith was able to demonstrate them prior to the end of the session.  Ranjith demonstrated good  understanding of the education provided.     See EMR under Patient Instructions for exercises provided 5/20/2019.    Assessment     D/c note: Pt tolerated therapy fair today due to increase of lower back pain. Pt was re-assessed today to be discharged. Pt demonstrated improvement of lumbar extensors muscle strength, but pt cont with intermittent lower back pain, which causes functional limitations at work.. Patient has attended 10 visits of the [facility] program for aerobic work, med ex isometric testing with dynamic strengthening on med ex equipment for spine, whole body strengthening on med ex equipment, HEP, education.  Patient has completed 10 visits Healthy Back Program and pt wanted to be d/c.  Importance of continuing there ex and body mech and ergonomics stressed.   Patient demonstrates improvement in ability to reduce symptoms, improved posture, improved ROM and improved strength.   Reviewed HEP, and importance of maintaining a healthy spine through continued stretching and performance of HEP, good posture, good ergonomics, good body mech with ADL, leisure, and work.   Patient  is demonstrating increased ability to reduce symptoms,  improved posture, improved   ROM, and improved  strength on med ex test.  Pt scored 7% below the normative data today. Pt was 55% below normative data in the initial eval. Pt demonstrated an 109% improvement on Medx lumbar extension muscle strength comparing to his initial data. Pt demonstrated Max peak torque of 291 ft.lbs and Min peak torque of 143 ft.lbs  today. Pt demonstrated an improvement since initial eval (Max peak torque of 125 ft.lbs and Min peak torque of 76 ft.lbs). Pt has met 2/6 LTGs today. Overall, pt is tolerating HB program well.Discharge handout with HEP given, and discussed aspects of exercise program, cardio, strengthening, and stretching.    Patient expressed understanding information given.  Patient plans to attend wellness and is ready to transition to wellness.    Anticipated Barriers for therapy: none  Pt's spiritual, cultural and educational needs considered and pt agreeable to plan of care and goals as stated below:     Goals:   Short term goals:  6 weeks or 10 visits   1.  Pt will demonstrate increased lumbar ROM by at least 3 degrees from the initial ROM value with improvements noted in functional ROM and ability to perform ADLs.  Goal not met 7/9/19  2.  Pt will demonstrate increased maximum isometric torque value by 30% when compared to the initial value resulting in improved ability to perform bending, lifting, and carrying activities safely, confidently.Goal met 7/9/2019  3.  Patient report a reduction in worst pain score by 1-2 points for improved tolerance for work.Goal partial met 7/9/19  4.  Pt able to perform HEP correctly with minimal cueing or supervision from therapist to encourage independent management of symptoms.Goal met 7-9-2019     Long term goals: 13 weeks or 20 visits   1. Pt will demonstrate increased lumbar ROM by at least 3 degrees from initial ROM value, resulting in improved ability to perform functional fwd bending while standing and sitting. Goal not met  7/9/2019  2. Pt will demonstrate increased maximum isometric torque value by 50% when compared to the initial value resulting in improved ability to perform bending, lifting, and carrying activities safely, confidently.Goal met 7/9/19  3. Pt to demonstrate ability to independently control and reduce their pain through posture positioning and mechanical movements throughout a typical day.Goal partial met 7/9/19  4.  Patient will demonstrate improved overall function per FOTO Survey to CJ = at least 20% but < 40% impaired, limited or restricted score or less in order to show subjective improvement of condition.  Goal not met 7-9-2019  5. Pt will demonstrate independence with the HEP at discharge  Goal met 7-9-19  6.  Pt will be able to work without any lower back pain to improve quality of life.   Goal not met 7-9-19     Plan     Pt will be discharged today.    Therapist: Jean-Claude Whatley, PT  07/09/2019

## 2019-09-03 DIAGNOSIS — G89.29 CHRONIC BILATERAL LOW BACK PAIN WITH RIGHT-SIDED SCIATICA: ICD-10-CM

## 2019-09-03 DIAGNOSIS — M54.41 CHRONIC BILATERAL LOW BACK PAIN WITH RIGHT-SIDED SCIATICA: ICD-10-CM

## 2019-09-03 RX ORDER — DICLOFENAC SODIUM 50 MG/1
50 TABLET, DELAYED RELEASE ORAL 2 TIMES DAILY
Qty: 60 TABLET | Refills: 0 | Status: SHIPPED | OUTPATIENT
Start: 2019-09-03

## 2019-09-09 ENCOUNTER — OFFICE VISIT (OUTPATIENT)
Dept: FAMILY MEDICINE | Facility: CLINIC | Age: 44
End: 2019-09-09
Payer: COMMERCIAL

## 2019-09-09 VITALS
HEART RATE: 84 BPM | BODY MASS INDEX: 31.12 KG/M2 | SYSTOLIC BLOOD PRESSURE: 111 MMHG | HEIGHT: 70 IN | OXYGEN SATURATION: 97 % | TEMPERATURE: 98 F | RESPIRATION RATE: 17 BRPM | DIASTOLIC BLOOD PRESSURE: 73 MMHG | WEIGHT: 217.38 LBS

## 2019-09-09 DIAGNOSIS — M54.41 CHRONIC BILATERAL LOW BACK PAIN WITH RIGHT-SIDED SCIATICA: Primary | ICD-10-CM

## 2019-09-09 DIAGNOSIS — G89.29 CHRONIC BILATERAL LOW BACK PAIN WITH RIGHT-SIDED SCIATICA: Primary | ICD-10-CM

## 2019-09-09 PROCEDURE — 99214 PR OFFICE/OUTPT VISIT, EST, LEVL IV, 30-39 MIN: ICD-10-PCS | Mod: S$GLB,,, | Performed by: FAMILY MEDICINE

## 2019-09-09 PROCEDURE — 99214 OFFICE O/P EST MOD 30 MIN: CPT | Mod: S$GLB,,, | Performed by: FAMILY MEDICINE

## 2019-09-09 PROCEDURE — 3078F DIAST BP <80 MM HG: CPT | Mod: CPTII,S$GLB,, | Performed by: FAMILY MEDICINE

## 2019-09-09 PROCEDURE — 3008F PR BODY MASS INDEX (BMI) DOCUMENTED: ICD-10-PCS | Mod: CPTII,S$GLB,, | Performed by: FAMILY MEDICINE

## 2019-09-09 PROCEDURE — 3008F BODY MASS INDEX DOCD: CPT | Mod: CPTII,S$GLB,, | Performed by: FAMILY MEDICINE

## 2019-09-09 PROCEDURE — 99999 PR PBB SHADOW E&M-EST. PATIENT-LVL III: ICD-10-PCS | Mod: PBBFAC,,, | Performed by: FAMILY MEDICINE

## 2019-09-09 PROCEDURE — 3074F SYST BP LT 130 MM HG: CPT | Mod: CPTII,S$GLB,, | Performed by: FAMILY MEDICINE

## 2019-09-09 PROCEDURE — 99999 PR PBB SHADOW E&M-EST. PATIENT-LVL III: CPT | Mod: PBBFAC,,, | Performed by: FAMILY MEDICINE

## 2019-09-09 PROCEDURE — 3078F PR MOST RECENT DIASTOLIC BLOOD PRESSURE < 80 MM HG: ICD-10-PCS | Mod: CPTII,S$GLB,, | Performed by: FAMILY MEDICINE

## 2019-09-09 PROCEDURE — 3074F PR MOST RECENT SYSTOLIC BLOOD PRESSURE < 130 MM HG: ICD-10-PCS | Mod: CPTII,S$GLB,, | Performed by: FAMILY MEDICINE

## 2019-09-09 RX ORDER — BACLOFEN 10 MG/1
10 TABLET ORAL NIGHTLY
Qty: 30 TABLET | Refills: 1 | Status: SHIPPED | OUTPATIENT
Start: 2019-09-09 | End: 2020-03-04

## 2019-09-09 NOTE — PROGRESS NOTES
Routine Office Visit    Patient Name: Ranjith Peguero    : 1975  MRN: [id number]    Subjective:  Ranjith is a 43 y.o. male who presents today for:    1. Back pain  Patient presenting with recurring lower back pain that he associates with work.  He states that he is having to work extra hours at work ().  The pain is staying in the lower back unless sitting for prolonged period of time.  No sudden weakness in the lower extremities.  No loss of bowel or bladder control.  He states he cannot bend down without significant pain.  This pain has recurring for over 2 months now.  He has lost weight and did the did the healthy back program.      Past Medical History  Past Medical History:   Diagnosis Date    Gout     L big toe    Hyperlipidemia     Hypertension        Past Surgical History  History reviewed. No pertinent surgical history.    Family History  Family History   Problem Relation Age of Onset    Hypertension Mother     Hypertension Father        Social History  Social History     Socioeconomic History    Marital status:      Spouse name: Not on file    Number of children: Not on file    Years of education: Not on file    Highest education level: Not on file   Occupational History    Not on file   Social Needs    Financial resource strain: Not on file    Food insecurity:     Worry: Not on file     Inability: Not on file    Transportation needs:     Medical: Not on file     Non-medical: Not on file   Tobacco Use    Smoking status: Never Smoker    Smokeless tobacco: Never Used   Substance and Sexual Activity    Alcohol use: Yes    Drug use: No    Sexual activity: Yes     Partners: Female   Lifestyle    Physical activity:     Days per week: Not on file     Minutes per session: Not on file    Stress: Not on file   Relationships    Social connections:     Talks on phone: Not on file     Gets together: Not on file     Attends [employer]ism service: Not on file     Active member of  club or organization: Not on file     Attends meetings of clubs or organizations: Not on file     Relationship status: Not on file   Other Topics Concern    Not on file   Social History Narrative    T[employer]ana @ [employer].  .  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         Current Medications  Current Outpatient Medications on File Prior to Visit   Medication Sig Dispense Refill    amlodipine-benazepril 5-20 mg (LOTREL) 5-20 mg per capsule Take 1 capsule by mouth once daily. 90 capsule 1    atorvastatin (LIPITOR) 10 MG tablet TAKE 1 TABLET BY MOUTH ONCE DAILY 90 tablet 0    colchicine (COLCRYS) 0.6 mg tablet TAKE 2 TABLETS BY MOUTH AT FIRST SIGN OF FLARE, FOLLOWED IN ONE HOUR WITH 1 TABLET; MAX OF 3 TABLETS WITHIN ONE HOUR 30 tablet 0    diclofenac (VOLTAREN) 50 MG EC tablet Take 1 tablet (50 mg total) by mouth 2 (two) times daily. 60 tablet 0    mupirocin (BACTROBAN) 2 % ointment Apply topically 3 (three) times daily. 30 g 0    triamcinolone acetonide 0.1% (KENALOG) 0.1 % cream IRMA AA ON THE SKIN QD TO BID  1    [DISCONTINUED] baclofen (LIORESAL) 10 MG tablet Take 1 tablet (10 mg total) by mouth every evening. 30 tablet 1    FLUVIRIN [id number] 45 mcg (15 mcg x 3)/0.5 mL Susp       hydrOXYzine HCl (ATARAX) 25 MG tablet Take 1 tablet (25 mg total) by mouth every 12 (twelve) hours as needed for Itching. 20 tablet 0     No current facility-administered medications on file prior to visit.        Allergies   Review of patient's allergies indicates:  No Known Allergies    Review of Systems (Pertinent positives)  Review of Systems   Constitutional: Negative.    HENT: Negative.    Eyes: Negative.    Respiratory: Negative.    Cardiovascular: Negative.    Gastrointestinal: Negative.    Genitourinary: Negative.    Musculoskeletal: Positive for back pain and myalgias.   Skin: Negative.    Neurological: Negative.          /73 (BP Location: Left arm, Patient Position: Sitting, BP Method: Medium (Automatic))   Pulse 84   Temp  "98.3 °F (36.8 °C) (Oral)   Resp 17   Ht 5' 10" (1.778 m)   Wt 98.6 kg (217 lb 6 oz)   SpO2 97%   BMI 31.19 kg/m²     GENERAL APPEARANCE: in no apparent distress and well developed and well nourished  HEENT: PERRL, EOMI, Sclera clear, anicteric, Oropharynx clear, no lesions, Neck supple with midline trachea  NECK: normal, supple, no adenopathy, thyroid normal in size  RESPIRATORY: appears well, vitals normal, no respiratory distress, acyanotic, normal RR, chest clear, no wheezing, crepitations, rhonchi, normal symmetric air entry  HEART: regular rate and rhythm, S1, S2 normal, no murmur, click, rub or gallop.    ABDOMEN: abdomen is soft without tenderness, no masses, no hernias, no organomegaly, no rebound, no guarding. Suprapubic tenderness absent. No CVA tenderness.  NEUROLOGIC: normal without focal findings, CN II-XII are intact.  reflexes 2 for biceps, brachial, patellar and achilles bilateral and symmetric, sensation grossly normal  SKIN: no rashes, no wounds, no other lesions  PSYCH: Alert, oriented x 3, thought content appropriate, speech normal, pleasant and cooperative, good eye contact, well groomed    Assessment/Plan:  Ranjith Peguero is a 43 y.o. male who presents today for :    Ranjith was seen today for back pain.    Diagnoses and all orders for this visit:    Chronic bilateral low back pain with right-sided sciatica  -     MRI Lumbar Spine Without Contrast; Future  -     baclofen (LIORESAL) 10 MG tablet; Take 1 tablet (10 mg total) by mouth every evening.      1.  MRI ordered due to duration of pain despite conservative therapy  2.  Refill baclofen  3.  Follow up 4 weeks or sooner if needed      Sukumar Corbin MD    "

## 2019-09-09 NOTE — LETTER
September 9, 2019      [facility]  [address], [address]  [place] [address]  Phone: [phone]       Patient: Ranjith Peguero   YOB: 1975  Date of Visit: 09/09/2019    To Whom It May Concern:    Desiree Peguero  was at [facility] on 09/09/2019. He may return to work/school on 9/11/19 with no restrictions.  He is currently incapacitated due to his condition and unable to perform his duties until 9/11/19. If you have any questions or concerns, or if I can be of further assistance, please do not hesitate to contact me.    Sincerely,          Sukumar Corbin MD

## 2019-09-12 ENCOUNTER — TELEPHONE (OUTPATIENT)
Dept: RADIOLOGY | Facility: HOSPITAL | Age: 44
End: 2019-09-12

## 2019-09-12 ENCOUNTER — TELEPHONE (OUTPATIENT)
Dept: FAMILY MEDICINE | Facility: CLINIC | Age: 44
End: 2019-09-12

## 2019-09-13 ENCOUNTER — HOSPITAL ENCOUNTER (OUTPATIENT)
Dept: RADIOLOGY | Facility: HOSPITAL | Age: 44
Discharge: HOME | End: 2019-09-13
Attending: FAMILY MEDICINE
Payer: COMMERCIAL

## 2019-09-13 DIAGNOSIS — M54.41 CHRONIC BILATERAL LOW BACK PAIN WITH RIGHT-SIDED SCIATICA: ICD-10-CM

## 2019-09-13 DIAGNOSIS — G89.29 CHRONIC BILATERAL LOW BACK PAIN WITH RIGHT-SIDED SCIATICA: ICD-10-CM

## 2019-09-13 PROCEDURE — 72148 MRI LUMBAR SPINE W/O DYE: CPT | Mod: TC

## 2019-09-13 PROCEDURE — 72148 MRI LUMBAR SPINE WITHOUT CONTRAST: ICD-10-PCS | Mod: 26,,, | Performed by: RADIOLOGY

## 2019-09-13 PROCEDURE — 72148 MRI LUMBAR SPINE W/O DYE: CPT | Mod: 26,,, | Performed by: RADIOLOGY

## 2019-09-17 ENCOUNTER — TELEPHONE (OUTPATIENT)
Dept: FAMILY MEDICINE | Facility: CLINIC | Age: 44
End: 2019-09-17

## 2019-09-17 NOTE — TELEPHONE ENCOUNTER
No answer, LVM for patient to return call to clinic. Patient doesn't need a 40min visit, he just needs an OV to discuss results. Patient does not need to speak to office staff to schedule an OV.       *PLEASE SCHEDULE PATIENT IN 1ST AVAILABLE.

## 2019-09-17 NOTE — TELEPHONE ENCOUNTER
----- Message from Nidia Cruz sent at 9/17/2019 11:19 AM CDT -----  Contact: TITI GUILLORY   Name of Who is Calling: TITI GUILLORY       What is the request in detail: Patient is requesting a call back to schedule a 40 minute follow up visit to discuss his MRI results.       Can the clinic reply by [facility]: No      What Number to Call Back if not in [facility]:  [phone]

## 2019-10-03 ENCOUNTER — OFFICE VISIT (OUTPATIENT)
Dept: FAMILY MEDICINE | Facility: CLINIC | Age: 44
End: 2019-10-03
Payer: COMMERCIAL

## 2019-10-03 ENCOUNTER — PATIENT MESSAGE (OUTPATIENT)
Dept: FAMILY MEDICINE | Facility: CLINIC | Age: 44
End: 2019-10-03

## 2019-10-03 VITALS
SYSTOLIC BLOOD PRESSURE: 124 MMHG | TEMPERATURE: 98 F | WEIGHT: 214.31 LBS | DIASTOLIC BLOOD PRESSURE: 80 MMHG | RESPIRATION RATE: 17 BRPM | HEIGHT: 70 IN | HEART RATE: 79 BPM | BODY MASS INDEX: 30.68 KG/M2 | OXYGEN SATURATION: 97 %

## 2019-10-03 DIAGNOSIS — M53.86 DECREASED RANGE OF MOTION OF LUMBAR SPINE: Primary | ICD-10-CM

## 2019-10-03 DIAGNOSIS — E78.5 DYSLIPIDEMIA: ICD-10-CM

## 2019-10-03 DIAGNOSIS — M62.81 WEAKNESS OF TRUNK MUSCULATURE: ICD-10-CM

## 2019-10-03 DIAGNOSIS — Z23 IMMUNIZATION DUE: ICD-10-CM

## 2019-10-03 DIAGNOSIS — I10 ESSENTIAL HYPERTENSION: ICD-10-CM

## 2019-10-03 PROCEDURE — 90686 IIV4 VACC NO PRSV 0.5 ML IM: CPT | Mod: S$GLB,,, | Performed by: FAMILY MEDICINE

## 2019-10-03 PROCEDURE — 3008F PR BODY MASS INDEX (BMI) DOCUMENTED: ICD-10-PCS | Mod: CPTII,S$GLB,, | Performed by: FAMILY MEDICINE

## 2019-10-03 PROCEDURE — 3079F PR MOST RECENT DIASTOLIC BLOOD PRESSURE 80-89 MM HG: ICD-10-PCS | Mod: CPTII,S$GLB,, | Performed by: FAMILY MEDICINE

## 2019-10-03 PROCEDURE — 99214 OFFICE O/P EST MOD 30 MIN: CPT | Mod: 25,S$GLB,, | Performed by: FAMILY MEDICINE

## 2019-10-03 PROCEDURE — 3074F PR MOST RECENT SYSTOLIC BLOOD PRESSURE < 130 MM HG: ICD-10-PCS | Mod: CPTII,S$GLB,, | Performed by: FAMILY MEDICINE

## 2019-10-03 PROCEDURE — 99999 PR PBB SHADOW E&M-EST. PATIENT-LVL III: CPT | Mod: PBBFAC,,, | Performed by: FAMILY MEDICINE

## 2019-10-03 PROCEDURE — 90471 FLU VACCINE (QUAD) GREATER THAN OR EQUAL TO 3YO PRESERVATIVE FREE IM: ICD-10-PCS | Mod: S$GLB,,, | Performed by: FAMILY MEDICINE

## 2019-10-03 PROCEDURE — 90471 IMMUNIZATION ADMIN: CPT | Mod: S$GLB,,, | Performed by: FAMILY MEDICINE

## 2019-10-03 PROCEDURE — 3079F DIAST BP 80-89 MM HG: CPT | Mod: CPTII,S$GLB,, | Performed by: FAMILY MEDICINE

## 2019-10-03 PROCEDURE — 3008F BODY MASS INDEX DOCD: CPT | Mod: CPTII,S$GLB,, | Performed by: FAMILY MEDICINE

## 2019-10-03 PROCEDURE — 99999 PR PBB SHADOW E&M-EST. PATIENT-LVL III: ICD-10-PCS | Mod: PBBFAC,,, | Performed by: FAMILY MEDICINE

## 2019-10-03 PROCEDURE — 90686 FLU VACCINE (QUAD) GREATER THAN OR EQUAL TO 3YO PRESERVATIVE FREE IM: ICD-10-PCS | Mod: S$GLB,,, | Performed by: FAMILY MEDICINE

## 2019-10-03 PROCEDURE — 3074F SYST BP LT 130 MM HG: CPT | Mod: CPTII,S$GLB,, | Performed by: FAMILY MEDICINE

## 2019-10-03 PROCEDURE — 99214 PR OFFICE/OUTPT VISIT, EST, LEVL IV, 30-39 MIN: ICD-10-PCS | Mod: 25,S$GLB,, | Performed by: FAMILY MEDICINE

## 2019-10-03 RX ORDER — ATORVASTATIN CALCIUM 10 MG/1
TABLET, FILM COATED ORAL
Qty: 90 TABLET | Refills: 0 | Status: SHIPPED | OUTPATIENT
Start: 2019-10-03 | End: 2020-01-05

## 2019-10-03 RX ORDER — AMLODIPINE AND BENAZEPRIL HYDROCHLORIDE 5; 20 MG/1; MG/1
1 CAPSULE ORAL DAILY
Qty: 90 CAPSULE | Refills: 1 | Status: SHIPPED | OUTPATIENT
Start: 2019-10-03 | End: 2020-03-27 | Stop reason: SDUPTHER

## 2019-10-03 NOTE — LETTER
October 3, 2019    Ranjith Peguero  [address]  [place] [address]         [facility]  [address]  [facility] [place] [address]  Phone: [phone] October 3, 2019     Patient: Ranjith Peguero   YOB: 1975   Date of Visit: 10/3/2019       To Whom It May Concern:    It is my medical opinion that Ranjith Peguero should reduce work hours to 8 hours per day 5 days a week due to chronic conditions that to do not allow him to work longer without risk to his health.    If you have any questions or concerns, please don't hesitate to call.    Sincerely,        Sukumar Corbin MD

## 2019-10-15 ENCOUNTER — TELEPHONE (OUTPATIENT)
Dept: FAMILY MEDICINE | Facility: CLINIC | Age: 44
End: 2019-10-15

## 2019-10-15 NOTE — TELEPHONE ENCOUNTER
Pt is scheduled to see Dr. Ching on 11/12/19 @ 4:20p. Dr. Paz did not have any available appointments. Thanks.

## 2019-10-20 NOTE — PROGRESS NOTES
"Routine Office Visit    Patient Name: Ranjith Peguero    : 1975  MRN: [id number]    Subjective:  Ranjith is a 44 y.o. male who presents today for:    1. Recurring back pain  Patient is presenting with recurring back spasms that have been ongoing for several months.  He states that prolonged sitting and walking causes his back to hurt.  He is a  and retired .  He states that there is no radiation of pain to lower extremities and no weakness or incontinence.  He states that driving to [facility] caused his back to hurt for "a while" and states he had to walk around for a while to get the pain to calm down.  He had an MRI that didn't reveal any structural cause for his pain.  He has not done PT to strengthen his lower back.  Patient states that his work does require him to do extra hours and there are some days that he has significant pain at the end of the day.      Past Medical History  Past Medical History:   Diagnosis Date    Gout     L big toe    Hyperlipidemia     Hypertension        Past Surgical History  No past surgical history on file.    Family History  Family History   Problem Relation Age of Onset    Hypertension Mother     Hypertension Father        Social History  Social History     Socioeconomic History    Marital status:      Spouse name: Not on file    Number of children: Not on file    Years of education: Not on file    Highest education level: Not on file   Occupational History    Not on file   Social Needs    Financial resource strain: Not on file    Food insecurity:     Worry: Not on file     Inability: Not on file    Transportation needs:     Medical: Not on file     Non-medical: Not on file   Tobacco Use    Smoking status: Never Smoker    Smokeless tobacco: Never Used   Substance and Sexual Activity    Alcohol use: Yes    Drug use: No    Sexual activity: Yes     Partners: Female   Lifestyle    Physical activity:     Days per week: Not on file " "    Minutes per session: Not on file    Stress: Not on file   Relationships    Social connections:     Talks on phone: Not on file     Gets together: Not on file     Attends Anabaptism service: Not on file     Active member of club or organization: Not on file     Attends meetings of clubs or organizations: Not on file     Relationship status: Not on file   Other Topics Concern    Not on file   Social History Narrative    Tatyana @ [employer].  .  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         Current Medications  Current Outpatient Medications on File Prior to Visit   Medication Sig Dispense Refill    baclofen (LIORESAL) 10 MG tablet Take 1 tablet (10 mg total) by mouth every evening. 30 tablet 1    diclofenac (VOLTAREN) 50 MG EC tablet Take 1 tablet (50 mg total) by mouth 2 (two) times daily. 60 tablet 0     No current facility-administered medications on file prior to visit.        Allergies   Review of patient's allergies indicates:  No Known Allergies    Review of Systems (Pertinent positives)  Review of Systems   Constitutional: Negative.    HENT: Negative.    Eyes: Negative.    Cardiovascular: Negative.    Gastrointestinal: Negative.    Genitourinary: Negative.    Musculoskeletal: Positive for back pain and myalgias. Negative for falls and joint pain.   Skin: Negative.    Neurological: Negative.          /80 (BP Location: Right arm, Patient Position: Sitting)   Pulse 79   Temp 98.2 °F (36.8 °C) (Oral)   Resp 17   Ht 5' 10" (1.778 m)   Wt 97.2 kg (214 lb 4.6 oz)   SpO2 97%   BMI 30.75 kg/m²     GENERAL APPEARANCE: in no apparent distress and well developed and well nourished  HEENT: PERRLA, EOMI, Sclera clear, anicteric, Oropharynx clear, no lesions, Neck supple with midline trachea  NECK: normal, supple, no adenopathy, thyroid normal in size  RESPIRATORY: appears well, vitals normal, no respiratory distress, acyanotic, normal RR, chest clear, no wheezing, crepitations, rhonchi, normal symmetric air " entry  HEART: regular rate and rhythm, S1, S2 normal, no murmur, click, rub or gallop.    ABDOMEN: abdomen is soft without tenderness, no masses, no hernias, no organomegaly, no rebound, no guarding. Suprapubic tenderness absent. No CVA tenderness.  NEUROLOGIC: normal without focal findings, CN II-XII are intact.    MS:  Decreased ROM with flexion and extension at the waist.  No pain on palpaiton of spinous processes; reflexes 2+ throughout  SKIN: no rashes, no wounds, no other lesions  PSYCH: Alert, oriented x 3, thought content appropriate, speech normal, pleasant and cooperative, good eye contact, well groomed.    Assessment/Plan:  Ranjith Peguero is a 44 y.o. male who presents today for :    Ranjith was seen today for medication refill, establish care and follow-up.    Diagnoses and all orders for this visit:    Decreased range of motion of lumbar spine  -     Ambulatory referral to Physical Medicine Rehab    Essential hypertension  -     amlodipine-benazepril 5-20 mg (LOTREL) 5-20 mg per capsule; Take 1 capsule by mouth once daily.    Dyslipidemia    Immunization due  -     Influenza - Quadrivalent (PF)    Weakness of trunk musculature  -     Ambulatory referral to Physical Medicine Rehab    1.  Will refer to PMR for evaluation as MRI was negative for bulging/herniated disks or spinal abnormalities that would cause his symptoms  2.  Refilled blood pressure medication  3.  Flu shot given  4.  Patient may benefit from healthy back, but await PMR eval  5.  Call with any concerns      Sukumar Corbin MD

## 2020-01-03 DIAGNOSIS — E78.5 DYSLIPIDEMIA: ICD-10-CM

## 2020-01-05 RX ORDER — ATORVASTATIN CALCIUM 10 MG/1
TABLET, FILM COATED ORAL
Qty: 90 TABLET | Refills: 0 | Status: SHIPPED | OUTPATIENT
Start: 2020-01-05 | End: 2020-03-27

## 2020-02-17 ENCOUNTER — PATIENT MESSAGE (OUTPATIENT)
Dept: FAMILY MEDICINE | Facility: CLINIC | Age: 45
End: 2020-02-17

## 2020-02-18 ENCOUNTER — TELEPHONE (OUTPATIENT)
Dept: FAMILY MEDICINE | Facility: CLINIC | Age: 45
End: 2020-02-18

## 2020-02-18 NOTE — TELEPHONE ENCOUNTER
----- Message from Angely Light sent at 2/18/2020  2:45 PM CST -----  Contact: Patient [phone]  Type:  Sooner Appointment Request    Patient is requesting a sooner appointment.  Patient scheduled first available appointment. Patient will accept being placed on the waitlist and is requesting a message be sent to doctor.    Name of Caller: Patient    When is the first available appointment? 03-25-20    Symptoms: follow up on blood pressure    Would the patient rather a call back or a response via [facility]? Call back    Best Call Back Number:  [phone]

## 2020-03-04 ENCOUNTER — OFFICE VISIT (OUTPATIENT)
Dept: FAMILY MEDICINE | Facility: CLINIC | Age: 45
End: 2020-03-04
Payer: COMMERCIAL

## 2020-03-04 VITALS
WEIGHT: 218.5 LBS | RESPIRATION RATE: 17 BRPM | HEIGHT: 70 IN | TEMPERATURE: 98 F | SYSTOLIC BLOOD PRESSURE: 120 MMHG | DIASTOLIC BLOOD PRESSURE: 81 MMHG | OXYGEN SATURATION: 96 % | BODY MASS INDEX: 31.28 KG/M2 | HEART RATE: 108 BPM

## 2020-03-04 DIAGNOSIS — N52.9 ERECTILE DYSFUNCTION, UNSPECIFIED ERECTILE DYSFUNCTION TYPE: ICD-10-CM

## 2020-03-04 DIAGNOSIS — I10 ESSENTIAL HYPERTENSION: ICD-10-CM

## 2020-03-04 DIAGNOSIS — M1A.0720 CHRONIC IDIOPATHIC GOUT INVOLVING TOE OF LEFT FOOT WITHOUT TOPHUS: Primary | ICD-10-CM

## 2020-03-04 DIAGNOSIS — E66.09 CLASS 1 OBESITY DUE TO EXCESS CALORIES WITH SERIOUS COMORBIDITY AND BODY MASS INDEX (BMI) OF 31.0 TO 31.9 IN ADULT: ICD-10-CM

## 2020-03-04 PROCEDURE — 99214 OFFICE O/P EST MOD 30 MIN: CPT | Mod: S$GLB,,, | Performed by: FAMILY MEDICINE

## 2020-03-04 PROCEDURE — 3079F DIAST BP 80-89 MM HG: CPT | Mod: CPTII,S$GLB,, | Performed by: FAMILY MEDICINE

## 2020-03-04 PROCEDURE — 99999 PR PBB SHADOW E&M-EST. PATIENT-LVL III: ICD-10-PCS | Mod: PBBFAC,,, | Performed by: FAMILY MEDICINE

## 2020-03-04 PROCEDURE — 3079F PR MOST RECENT DIASTOLIC BLOOD PRESSURE 80-89 MM HG: ICD-10-PCS | Mod: CPTII,S$GLB,, | Performed by: FAMILY MEDICINE

## 2020-03-04 PROCEDURE — 99999 PR PBB SHADOW E&M-EST. PATIENT-LVL III: CPT | Mod: PBBFAC,,, | Performed by: FAMILY MEDICINE

## 2020-03-04 PROCEDURE — 3008F PR BODY MASS INDEX (BMI) DOCUMENTED: ICD-10-PCS | Mod: CPTII,S$GLB,, | Performed by: FAMILY MEDICINE

## 2020-03-04 PROCEDURE — 3074F SYST BP LT 130 MM HG: CPT | Mod: CPTII,S$GLB,, | Performed by: FAMILY MEDICINE

## 2020-03-04 PROCEDURE — 99214 PR OFFICE/OUTPT VISIT, EST, LEVL IV, 30-39 MIN: ICD-10-PCS | Mod: S$GLB,,, | Performed by: FAMILY MEDICINE

## 2020-03-04 PROCEDURE — 3008F BODY MASS INDEX DOCD: CPT | Mod: CPTII,S$GLB,, | Performed by: FAMILY MEDICINE

## 2020-03-04 PROCEDURE — 3074F PR MOST RECENT SYSTOLIC BLOOD PRESSURE < 130 MM HG: ICD-10-PCS | Mod: CPTII,S$GLB,, | Performed by: FAMILY MEDICINE

## 2020-03-04 RX ORDER — SILDENAFIL CITRATE 20 MG/1
TABLET ORAL
Qty: 30 TABLET | Refills: 3 | Status: SHIPPED | OUTPATIENT
Start: 2020-03-04 | End: 2020-06-10 | Stop reason: SDUPTHER

## 2020-03-04 RX ORDER — COLCHICINE 0.6 MG/1
0.6 TABLET ORAL DAILY
Qty: 30 TABLET | Refills: 11 | Status: SHIPPED | OUTPATIENT
Start: 2020-03-04 | End: 2020-08-25 | Stop reason: SDUPTHER

## 2020-03-04 NOTE — PROGRESS NOTES
Routine Office Visit    Patient Name: Ranjith Peguero    : 1975  MRN: [id number]    Subjective:  Ranjith is a 44 y.o. male who presents today for:    1. Gout  Patient presenting today for follow up of gout.  He has had one flare since last visit.  He is not on daily medications.  He is on colchicine as needed for flares.  No joint pains or muslce pains. No swelling of joints    2.  ED   Patient presenting today with new complaint of ED.  He states that it started after taking HTN meds.  He has no changes in urinary habits.  He had one episode of acute lower abdominal pain and scrotal pain that resolved in an hour.        Past Medical History  Past Medical History:   Diagnosis Date    Gout     L big toe    Hyperlipidemia     Hypertension        Past Surgical History  History reviewed. No pertinent surgical history.    Family History  Family History   Problem Relation Age of Onset    Hypertension Mother     Hypertension Father        Social History  Social History     Socioeconomic History    Marital status:      Spouse name: Not on file    Number of children: Not on file    Years of education: Not on file    Highest education level: Not on file   Occupational History    Not on file   Social Needs    Financial resource strain: Not on file    Food insecurity:     Worry: Not on file     Inability: Not on file    Transportation needs:     Medical: Not on file     Non-medical: Not on file   Tobacco Use    Smoking status: Never Smoker    Smokeless tobacco: Never Used   Substance and Sexual Activity    Alcohol use: Yes    Drug use: No    Sexual activity: Yes     Partners: Female   Lifestyle    Physical activity:     Days per week: Not on file     Minutes per session: Not on file    Stress: Not on file   Relationships    Social connections:     Talks on phone: Not on file     Gets together: Not on file     Attends Lutheran service: Not on file     Active member of club or organization: Not on  "file     Attends meetings of clubs or organizations: Not on file     Relationship status: Not on file   Other Topics Concern    Not on file   Social History Narrative    [employer]ana @ [employer]  .  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         Current Medications  Current Outpatient Medications on File Prior to Visit   Medication Sig Dispense Refill    amlodipine-benazepril 5-20 mg (LOTREL) 5-20 mg per capsule Take 1 capsule by mouth once daily. 90 capsule 1    atorvastatin (LIPITOR) 10 MG tablet TAKE 1 TABLET BY MOUTH ONCE DAILY 90 tablet 0    diclofenac (VOLTAREN) 50 MG EC tablet Take 1 tablet (50 mg total) by mouth 2 (two) times daily. 60 tablet 0    [DISCONTINUED] baclofen (LIORESAL) 10 MG tablet Take 1 tablet (10 mg total) by mouth every evening. 30 tablet 1     No current facility-administered medications on file prior to visit.        Allergies   Review of patient's allergies indicates:  No Known Allergies    Review of Systems (Pertinent positives)  Review of Systems   Constitutional: Negative.  Negative for malaise/fatigue.   HENT: Negative.    Eyes: Negative.  Negative for blurred vision.   Respiratory: Negative.  Negative for shortness of breath.    Cardiovascular: Negative.  Negative for chest pain, palpitations, orthopnea and PND.   Genitourinary:        +ED   Musculoskeletal: Negative for back pain, joint pain and neck pain.   Skin: Negative.    Neurological: Negative for dizziness and headaches.         /81 (BP Location: Left arm, Patient Position: Sitting, BP Method: Large (Automatic))   Pulse 108   Temp 98.3 °F (36.8 °C) (Oral)   Resp 17   Ht 5' 10" (1.778 m)   Wt 99.1 kg (218 lb 7.6 oz)   SpO2 96%   BMI 31.35 kg/m²     GENERAL APPEARANCE: in no apparent distress and well developed and well nourished  HEENT: PERRL, EOMI, Sclera clear, anicteric, Oropharynx clear, no lesions, Neck supple with midline trachea  NECK: normal, supple, no adenopathy, thyroid normal in size  RESPIRATORY: appears well, " vitals normal, no respiratory distress, acyanotic, normal RR, chest clear, no wheezing, crepitations, rhonchi, normal symmetric air entry  HEART: regular rate and rhythm, S1, S2 normal, no murmur, click, rub or gallop.    ABDOMEN: abdomen is soft without tenderness, no masses, no hernias, no organomegaly, no rebound, no guarding. Suprapubic tenderness absent. No CVA tenderness.  MS:  No joint deformities; no pain on palpation of spinous processes  SKIN: no rashes, no wounds, no other lesions  PSYCH: Alert, oriented x 3, thought content appropriate, speech normal, pleasant and cooperative, good eye contact, well groomed    Assessment/Plan:  Ranjith Peguero is a 44 y.o. male who presents today for :    Ranjith was seen today for follow-up.    Diagnoses and all orders for this visit:    Chronic idiopathic gout involving toe of left foot without tophus  -     colchicine (COLCRYS) 0.6 mg tablet; Take 1 tablet (0.6 mg total) by mouth once daily.    Erectile dysfunction, unspecified erectile dysfunction type  -     sildenafil (REVATIO) 20 mg Tab; Take 2-5 tablets one hour prior to sex    Essential hypertension    Class 1 obesity due to excess calories with serious comorbidity and body mass index (BMI) of 31.0 to 31.9 in adult      1.  Refill colcrys  2.  revatio for ED  3.  Follow up 3 months or sooner if needed      MD Sukumar Reddy MD

## 2020-03-25 PROBLEM — M54.50 ACUTE BILATERAL LOW BACK PAIN WITHOUT SCIATICA: Status: RESOLVED | Noted: 2019-05-10 | Resolved: 2020-03-25

## 2020-03-25 PROBLEM — M62.81 WEAKNESS OF TRUNK MUSCULATURE: Status: RESOLVED | Noted: 2019-05-10 | Resolved: 2020-03-25

## 2020-03-25 PROBLEM — M53.86 DECREASED RANGE OF MOTION OF LUMBAR SPINE: Status: RESOLVED | Noted: 2019-05-10 | Resolved: 2020-03-25

## 2020-03-27 DIAGNOSIS — E78.5 DYSLIPIDEMIA: ICD-10-CM

## 2020-03-27 DIAGNOSIS — I10 ESSENTIAL HYPERTENSION: ICD-10-CM

## 2020-03-27 RX ORDER — ATORVASTATIN CALCIUM 10 MG/1
TABLET, FILM COATED ORAL
Qty: 90 TABLET | Refills: 0 | Status: SHIPPED | OUTPATIENT
Start: 2020-03-27 | End: 2020-07-28

## 2020-03-27 RX ORDER — AMLODIPINE AND BENAZEPRIL HYDROCHLORIDE 5; 20 MG/1; MG/1
1 CAPSULE ORAL DAILY
Qty: 90 CAPSULE | Refills: 1 | Status: CANCELLED | OUTPATIENT
Start: 2020-03-27

## 2020-03-27 RX ORDER — AMLODIPINE AND BENAZEPRIL HYDROCHLORIDE 5; 20 MG/1; MG/1
1 CAPSULE ORAL DAILY
Qty: 90 CAPSULE | Refills: 1 | Status: SHIPPED | OUTPATIENT
Start: 2020-03-27 | End: 2020-10-20 | Stop reason: SDUPTHER

## 2020-03-27 NOTE — TELEPHONE ENCOUNTER
----- Message from Lashay Niño sent at 3/27/2020 10:37 AM CDT -----  Contact: self  Type: Patient Call Back       What is the request in detail:  Pt calling to check status on FMLA paper work.     Can the clinic reply by [facility]? No       Would the patient rather a call back or a response via [facility]? Call back       Best call back number:  [phone]

## 2020-04-19 ENCOUNTER — NURSE TRIAGE (OUTPATIENT)
Dept: ADMINISTRATIVE | Facility: CLINIC | Age: 45
End: 2020-04-19

## 2020-04-19 NOTE — TELEPHONE ENCOUNTER
states that wife had symptoms of carona virus and tested positive today.  She had symptoms in march   Now is  Symptom free but works at [employer] and is concerned about spreading virus

## 2020-04-19 NOTE — TELEPHONE ENCOUNTER
Reason for Disposition   COVID-19 Testing, questions about    Additional Information   Negative: SEVERE difficulty breathing (e.g., struggling for each breath, speaks in single words)   Negative: Difficult to awaken or acting confused (e.g., disoriented, slurred speech)   Negative: Bluish (or gray) lips or face now   Negative: Shock suspected (e.g., cold/pale/clammy skin, too weak to stand, low BP, rapid pulse)   Negative: Sounds like a life-threatening emergency to the triager   Negative: [1] COVID-19 suspected (e.g., cough, fever, shortness of breath) AND [2] public health department recommends testing   Negative: [1] COVID-19 exposure AND [2] no symptoms   Negative: COVID-19 and Breastfeeding, questions about   Negative: Patient sounds very sick or weak to the triager   Negative: MILD difficulty breathing (e.g., minimal/no SOB at rest, SOB with walking, pulse <100)   Negative: Chest pain   Negative: Fever > 103 F (39.4 C)   Negative: [1] Fever > 101 F (38.3 C) AND [2] age > 60   Negative: [1] Fever > 100.0 F (37.8 C) AND [2] bedridden (e.g., nursing home patient, CVA, chronic illness, recovering from surgery)   Negative: HIGH RISK patient (e.g., age > 64 years, diabetes, heart or lung disease, weak immune system)   Negative: Cough present > 3 weeks   Negative: 1] COVID-19 infection diagnosed or suspected AND [2] mild symptoms (fever, cough) AND [2] no trouble breathing or other complications   Negative: COVID-19, questions about   Negative: COVID-19 Home Isolation, questions about   Negative: COVID-19 Prevention and Healthy Living, questions about    Protocols used: CORONAVIRUS (COVID-19) DIAGNOSED OR RUNPTSRIR-C-FD

## 2020-04-20 ENCOUNTER — OFFICE VISIT (OUTPATIENT)
Dept: FAMILY MEDICINE | Facility: CLINIC | Age: 45
End: 2020-04-20
Payer: COMMERCIAL

## 2020-04-20 ENCOUNTER — PATIENT MESSAGE (OUTPATIENT)
Dept: FAMILY MEDICINE | Facility: CLINIC | Age: 45
End: 2020-04-20

## 2020-04-20 DIAGNOSIS — M1A.0720 CHRONIC IDIOPATHIC GOUT INVOLVING TOE OF LEFT FOOT WITHOUT TOPHUS: Primary | ICD-10-CM

## 2020-04-20 DIAGNOSIS — Z20.822 CLOSE EXPOSURE TO COVID-19 VIRUS: ICD-10-CM

## 2020-04-20 PROCEDURE — 99214 PR OFFICE/OUTPT VISIT, EST, LEVL IV, 30-39 MIN: ICD-10-PCS | Mod: 95,,, | Performed by: FAMILY MEDICINE

## 2020-04-20 PROCEDURE — 99214 OFFICE O/P EST MOD 30 MIN: CPT | Mod: 95,,, | Performed by: FAMILY MEDICINE

## 2020-04-20 NOTE — PROGRESS NOTES
Routine Office Visit    Patient Name: Ranjith Peguero    : 1975  MRN: [id number]    Subjective:  Ranjith is a 44 y.o. male who presents today for:    1. Gout   Patient following up today for chronic gout.  He states he is doing well.  No recent flares.  He is taking all medications as prescribed.  He states that overall he does feel great    2.  Covid exposure  Patient presenting today after his wife tested positive for COVID 19 last week.  He states she was sick with body aches, congestion, and loss of taste and smell 5 weeks ago. He nor is two sons (ages 15 and 19) have had any symptoms.  He states she was initially told that she likely had the flu, but due to lack of fever didn't meet criteria for testing initially.  After talking to another provider last week she was tested despite having no symptoms and tested positive.  She did work for [employer], but they have been closed for several weeks.  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      Past Medical History  Past Medical History:   Diagnosis Date    Gout     L big toe    Hyperlipidemia     Hypertension        Past Surgical History  No past surgical history on file.    Family History  Family History   Problem Relation Age of Onset    Hypertension Mother     Hypertension Father        Social History  Social History     Socioeconomic History    Marital status:      Spouse name: Not on file    Number of children: Not on file    Years of education: Not on file    Highest education level: Not on file   Occupational History    Not on file   Social Needs    Financial resource strain: Not on file    Food insecurity:     Worry: Not on file     Inability: Not on file    Transportation needs:     Medical: Not on file     Non-medical: Not on file   Tobacco Use    Smoking status: Never Smoker    Smokeless tobacco: Never Used   Substance and Sexual  Activity    Alcohol use: Yes    Drug use: No    Sexual activity: Yes     Partners: Female   Lifestyle    Physical activity:     Days per week: Not on file     Minutes per session: Not on file    Stress: Not on file   Relationships    Social connections:     Talks on phone: Not on file     Gets together: Not on file     Attends Alevism service: Not on file     Active member of club or organization: Not on file     Attends meetings of clubs or organizations: Not on file     Relationship status: Not on file   Other Topics Concern    Not on file   Social History Narrative    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         Current Medications  Current Outpatient Medications on File Prior to Visit   Medication Sig Dispense Refill    amlodipine-benazepril 5-20 mg (LOTREL) 5-20 mg per capsule Take 1 capsule by mouth once daily. 90 capsule 1    atorvastatin (LIPITOR) 10 MG tablet TAKE 1 TABLET BY MOUTH EVERY DAY 90 tablet 0    colchicine (COLCRYS) 0.6 mg tablet Take 1 tablet (0.6 mg total) by mouth once daily. 30 tablet 11    diclofenac (VOLTAREN) 50 MG EC tablet Take 1 tablet (50 mg total) by mouth 2 (two) times daily. 60 tablet 0    sildenafil (REVATIO) 20 mg Tab Take 2-5 tablets one hour prior to sex 30 tablet 3     No current facility-administered medications on file prior to visit.        Allergies   Review of patient's allergies indicates:  No Known Allergies    Review of Systems (Pertinent positives)  Review of Systems   Constitutional: Negative.    HENT: Negative for hearing loss.    Eyes: Negative for discharge.   Respiratory: Negative for wheezing.    Cardiovascular: Negative for chest pain and palpitations.   Gastrointestinal: Negative for blood in stool, constipation, diarrhea and vomiting.   Genitourinary: Negative for hematuria and urgency.   Musculoskeletal: Negative for neck pain.   Skin: Negative.    Neurological: Negative for weakness and headaches.   Endo/Heme/Allergies: Negative  for polydipsia.         There were no vitals taken for this visit.    GENERAL APPEARANCE: in no apparent distress and well developed and well nourished  HEENT: PERRL, EOMI, Sclera clear, anicteric, Oropharynx clear, no lesions, Neck supple with midline trachea  NECK: normal, supple, no adenopathy, thyroid normal in size  RESPIRATORY: appears well, vitals normal, no respiratory distress, acyanotic, normal RR  SKIN: no rashes, no wounds, no other lesions  PSYCH: Alert, oriented x 3, thought content appropriate, speech normal, pleasant and cooperative, good eye contact, well groomed    Assessment/Plan:  Ranjith Peguero is a 44 y.o. male who presents today for :    Diagnoses and all orders for this visit:    Chronic idiopathic gout involving toe of left foot without tophus    Close Exposure to Covid-19 Virus  -     COVID-19 Routine Screening      1.  Gout controlled at this time  2.  He was exposed to covid 19 so recommended he and his 19 year old son go to [place] [facility] to be swabbed  3.  He is to call with any concerns  4.  Follow up 3 months or sooner to check blood pressure and uric acid      Sukumar Corbin MD      The patient location is: home  The chief complaint leading to consultation is: covid expsoure and gout   Visit type: audiovisual  Total time spent with patient: 15 minutes  Each patient to whom he or she provides medical services by telemedicine is:  (1) informed of the relationship between the physician and patient and the respective role of any other health care provider with respect to management of the patient; and (2) notified that he or she may decline to receive medical services by telemedicine and may withdraw from such care at any time.

## 2020-04-23 ENCOUNTER — LAB VISIT (OUTPATIENT)
Dept: FAMILY MEDICINE | Facility: CLINIC | Age: 45
End: 2020-04-23
Payer: COMMERCIAL

## 2020-04-23 DIAGNOSIS — Z20.822 SUSPECTED COVID-19 VIRUS INFECTION: Primary | ICD-10-CM

## 2020-04-23 DIAGNOSIS — Z20.822 SUSPECTED COVID-19 VIRUS INFECTION: ICD-10-CM

## 2020-04-23 PROCEDURE — U0002 COVID-19 LAB TEST NON-CDC: HCPCS

## 2020-04-24 ENCOUNTER — PATIENT MESSAGE (OUTPATIENT)
Dept: FAMILY MEDICINE | Facility: CLINIC | Age: 45
End: 2020-04-24

## 2020-04-24 LAB — SARS-COV-2 RNA RESP QL NAA+PROBE: NOT DETECTED

## 2020-05-07 DIAGNOSIS — I10 ESSENTIAL HYPERTENSION: ICD-10-CM

## 2020-05-25 ENCOUNTER — PATIENT MESSAGE (OUTPATIENT)
Dept: FAMILY MEDICINE | Facility: CLINIC | Age: 45
End: 2020-05-25

## 2020-05-29 DIAGNOSIS — I10 ESSENTIAL HYPERTENSION: ICD-10-CM

## 2020-06-29 ENCOUNTER — TELEPHONE (OUTPATIENT)
Dept: FAMILY MEDICINE | Facility: CLINIC | Age: 45
End: 2020-06-29

## 2020-06-29 NOTE — TELEPHONE ENCOUNTER
----- Message from Andra Decker sent at 6/29/2020  7:21 AM CDT -----  Name of Who is Calling: TITI GUILLORY [[id number]]    What is the request in detail: Patient is requesting a call back regards to [facility] paperwork ..Please contact to further discuss and advise      Can the clinic reply by [facility]: No     What Number to Call Back if not in [facility]:  [phone] (home)

## 2020-06-29 NOTE — TELEPHONE ENCOUNTER
Patient called in regards to [place] paperwork. Patient stated paperwork was due on today. Pt is aware that paper work is on providers desk.

## 2020-07-29 ENCOUNTER — OFFICE VISIT (OUTPATIENT)
Dept: FAMILY MEDICINE | Facility: CLINIC | Age: 45
End: 2020-07-29
Payer: COMMERCIAL

## 2020-07-29 VITALS
HEART RATE: 85 BPM | DIASTOLIC BLOOD PRESSURE: 85 MMHG | SYSTOLIC BLOOD PRESSURE: 130 MMHG | RESPIRATION RATE: 17 BRPM | BODY MASS INDEX: 31.18 KG/M2 | TEMPERATURE: 99 F | WEIGHT: 217.81 LBS | HEIGHT: 70 IN | OXYGEN SATURATION: 97 %

## 2020-07-29 DIAGNOSIS — G89.29 CHRONIC BILATERAL LOW BACK PAIN WITHOUT SCIATICA: Primary | ICD-10-CM

## 2020-07-29 DIAGNOSIS — M54.50 CHRONIC BILATERAL LOW BACK PAIN WITHOUT SCIATICA: Primary | ICD-10-CM

## 2020-07-29 PROCEDURE — 3008F BODY MASS INDEX DOCD: CPT | Mod: CPTII,S$GLB,, | Performed by: FAMILY MEDICINE

## 2020-07-29 PROCEDURE — 3079F DIAST BP 80-89 MM HG: CPT | Mod: CPTII,S$GLB,, | Performed by: FAMILY MEDICINE

## 2020-07-29 PROCEDURE — 99999 PR PBB SHADOW E&M-EST. PATIENT-LVL III: ICD-10-PCS | Mod: PBBFAC,,, | Performed by: FAMILY MEDICINE

## 2020-07-29 PROCEDURE — 99213 OFFICE O/P EST LOW 20 MIN: CPT | Mod: S$GLB,,, | Performed by: FAMILY MEDICINE

## 2020-07-29 PROCEDURE — 99999 PR PBB SHADOW E&M-EST. PATIENT-LVL III: CPT | Mod: PBBFAC,,, | Performed by: FAMILY MEDICINE

## 2020-07-29 PROCEDURE — 99213 PR OFFICE/OUTPT VISIT, EST, LEVL III, 20-29 MIN: ICD-10-PCS | Mod: S$GLB,,, | Performed by: FAMILY MEDICINE

## 2020-07-29 PROCEDURE — 3079F PR MOST RECENT DIASTOLIC BLOOD PRESSURE 80-89 MM HG: ICD-10-PCS | Mod: CPTII,S$GLB,, | Performed by: FAMILY MEDICINE

## 2020-07-29 PROCEDURE — 3075F SYST BP GE 130 - 139MM HG: CPT | Mod: CPTII,S$GLB,, | Performed by: FAMILY MEDICINE

## 2020-07-29 PROCEDURE — 3075F PR MOST RECENT SYSTOLIC BLOOD PRESS GE 130-139MM HG: ICD-10-PCS | Mod: CPTII,S$GLB,, | Performed by: FAMILY MEDICINE

## 2020-07-29 PROCEDURE — 3008F PR BODY MASS INDEX (BMI) DOCUMENTED: ICD-10-PCS | Mod: CPTII,S$GLB,, | Performed by: FAMILY MEDICINE

## 2020-07-29 NOTE — PROGRESS NOTES
Routine Office Visit    Patient Name: Ranjith Peguero    : 1975  MRN: [id number]    Subjective:  Ranjith is a 44 y.o. male who presents today for:    1. Back pain  Patient presenting today with recurring low back pain for which he has been getting for several years now.  He has had FMLA paperwork filled out to allow him time off when he has flares of either his low back pain or his gout.  Patient states that his boss told him he needs separate paperwork filled out for each condition.  He worse for [employer].  Patient has no radiculopathy and no weakness.  He has been doing this job for 6 years.  No recent gout flare      Past Medical History  Past Medical History:   Diagnosis Date    Gout     L big toe    Hyperlipidemia     Hypertension        Past Surgical History  History reviewed. No pertinent surgical history.    Family History  Family History   Problem Relation Age of Onset    Hypertension Mother     Hypertension Father        Social History  Social History     Socioeconomic History    Marital status:      Spouse name: Not on file    Number of children: Not on file    Years of education: Not on file    Highest education level: Not on file   Occupational History    Not on file   Social Needs    Financial resource strain: Not on file    Food insecurity     Worry: Not on file     Inability: Not on file    Transportation needs     Medical: Not on file     Non-medical: Not on file   Tobacco Use    Smoking status: Never Smoker    Smokeless tobacco: Never Used   Substance and Sexual Activity    Alcohol use: Yes    Drug use: No    Sexual activity: Yes     Partners: Female   Lifestyle    Physical activity     Days per week: Not on file     Minutes per session: Not on file    Stress: Not on file   Relationships    Social connections     Talks on phone: Not on file     Gets together: Not on file     Attends Islam service: Not on file     Active member of club or organization: Not on file      "Attends meetings of clubs or organizations: Not on file     Relationship status: Not on file   Other Topics Concern    Not on file   Social History Narrative    [employer] @ [employer]  .  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         Current Medications  Current Outpatient Medications on File Prior to Visit   Medication Sig Dispense Refill    amlodipine-benazepril 5-20 mg (LOTREL) 5-20 mg per capsule Take 1 capsule by mouth once daily. 90 capsule 1    atorvastatin (LIPITOR) 10 MG tablet TAKE 1 TABLET BY MOUTH EVERY DAY 90 tablet 0    colchicine (COLCRYS) 0.6 mg tablet Take 1 tablet (0.6 mg total) by mouth once daily. 30 tablet 11    diclofenac (VOLTAREN) 50 MG EC tablet Take 1 tablet (50 mg total) by mouth 2 (two) times daily. 60 tablet 0    sildenafil (REVATIO) 20 mg Tab Take 2-5 tablets one hour prior to sex 30 tablet 3     No current facility-administered medications on file prior to visit.        Allergies   Review of patient's allergies indicates:  No Known Allergies    Review of Systems (Pertinent positives)  Review of Systems   Constitutional: Negative for fever and weight loss.   HENT: Negative.    Eyes: Negative.    Respiratory: Negative.    Cardiovascular: Negative for chest pain.   Gastrointestinal: Negative for abdominal pain.   Genitourinary: Negative for dysuria and hematuria.   Musculoskeletal: Positive for back pain.   Skin: Negative.    Neurological: Negative for tingling, weakness and headaches.         /85 (BP Location: Left arm, Patient Position: Sitting, BP Method: Medium (Automatic))   Pulse 85   Temp 98.5 °F (36.9 °C) (Oral)   Resp 17   Ht 5' 10" (1.778 m)   Wt 98.8 kg (217 lb 13 oz)   SpO2 97%   BMI 31.25 kg/m²     GENERAL APPEARANCE: in no apparent distress and well developed and well nourished  HEENT: PERRL, EOMI, Sclera clear, anicteric, Oropharynx clear, no lesions, Neck supple with midline trachea  NECK: normal, supple, no adenopathy, thyroid normal in size  RESPIRATORY: appears " well, vitals normal, no respiratory distress, acyanotic, normal RR, chest clear, no wheezing, crepitations, rhonchi, normal symmetric air entry  HEART: regular rate and rhythm, S1, S2 normal, no murmur, click, rub or gallop.    ABDOMEN: abdomen is soft without tenderness, no masses, no hernias, no organomegaly, no rebound, no guarding. Suprapubic tenderness absent. No CVA tenderness.  NEUROLOGIC: normal without focal findings, CN II-XII are intact.  reflexes 2 for patellar bilateral and symmetric, sensation grossly normal, gait and station normal, finger to nose and cerebellar exam normal, no tremors  SKIN: no rashes, no wounds, no other lesions  PSYCH: Alert, oriented x 3, thought content appropriate, speech normal, pleasant and cooperative, good eye contact, well groomed    Assessment/Plan:  Ranjith Peguero is a 44 y.o. male who presents today for :    Ranjith was seen today for follow-up and back pain.    Diagnoses and all orders for this visit:    Chronic bilateral low back pain without sciatica      1.  Continue baclofen and aleve as directed  2.  Follow up as needed  3.  Paperwork clarified and returned to patient  4.  Call with any concerns      Sukumar Corbin MD

## 2020-08-14 DIAGNOSIS — Z11.59 NEED FOR HEPATITIS C SCREENING TEST: ICD-10-CM

## 2020-08-25 ENCOUNTER — OFFICE VISIT (OUTPATIENT)
Dept: FAMILY MEDICINE | Facility: CLINIC | Age: 45
End: 2020-08-25
Payer: COMMERCIAL

## 2020-08-25 VITALS — SYSTOLIC BLOOD PRESSURE: 124 MMHG | DIASTOLIC BLOOD PRESSURE: 78 MMHG

## 2020-08-25 DIAGNOSIS — M1A.0720 CHRONIC IDIOPATHIC GOUT INVOLVING TOE OF LEFT FOOT WITHOUT TOPHUS: ICD-10-CM

## 2020-08-25 DIAGNOSIS — M10.072 ACUTE IDIOPATHIC GOUT OF LEFT FOOT: Primary | ICD-10-CM

## 2020-08-25 PROCEDURE — 96372 PR INJECTION,THERAP/PROPH/DIAG2ST, IM OR SUBCUT: ICD-10-PCS | Mod: S$GLB,,, | Performed by: INTERNAL MEDICINE

## 2020-08-25 PROCEDURE — 3074F SYST BP LT 130 MM HG: CPT | Mod: CPTII,S$GLB,, | Performed by: INTERNAL MEDICINE

## 2020-08-25 PROCEDURE — 99999 PR PBB SHADOW E&M-EST. PATIENT-LVL III: CPT | Mod: PBBFAC,,, | Performed by: INTERNAL MEDICINE

## 2020-08-25 PROCEDURE — 99999 PR PBB SHADOW E&M-EST. PATIENT-LVL III: ICD-10-PCS | Mod: PBBFAC,,, | Performed by: INTERNAL MEDICINE

## 2020-08-25 PROCEDURE — 3078F PR MOST RECENT DIASTOLIC BLOOD PRESSURE < 80 MM HG: ICD-10-PCS | Mod: CPTII,S$GLB,, | Performed by: INTERNAL MEDICINE

## 2020-08-25 PROCEDURE — 96372 THER/PROPH/DIAG INJ SC/IM: CPT | Mod: S$GLB,,, | Performed by: INTERNAL MEDICINE

## 2020-08-25 PROCEDURE — 99214 OFFICE O/P EST MOD 30 MIN: CPT | Mod: 25,S$GLB,, | Performed by: INTERNAL MEDICINE

## 2020-08-25 PROCEDURE — 3078F DIAST BP <80 MM HG: CPT | Mod: CPTII,S$GLB,, | Performed by: INTERNAL MEDICINE

## 2020-08-25 PROCEDURE — 99214 PR OFFICE/OUTPT VISIT, EST, LEVL IV, 30-39 MIN: ICD-10-PCS | Mod: 25,S$GLB,, | Performed by: INTERNAL MEDICINE

## 2020-08-25 PROCEDURE — 3074F PR MOST RECENT SYSTOLIC BLOOD PRESSURE < 130 MM HG: ICD-10-PCS | Mod: CPTII,S$GLB,, | Performed by: INTERNAL MEDICINE

## 2020-08-25 RX ORDER — INDOMETHACIN 75 MG/1
CAPSULE, EXTENDED RELEASE ORAL
COMMUNITY
End: 2021-03-31

## 2020-08-25 RX ORDER — INDOMETHACIN 50 MG/1
50 CAPSULE ORAL
Qty: 15 CAPSULE | Refills: 1 | Status: SHIPPED | OUTPATIENT
Start: 2020-08-25 | End: 2021-03-31

## 2020-08-25 RX ORDER — BACLOFEN 10 MG/1
TABLET ORAL
COMMUNITY

## 2020-08-25 RX ORDER — DEXAMETHASONE SODIUM PHOSPHATE 4 MG/ML
8 INJECTION, SOLUTION INTRA-ARTICULAR; INTRALESIONAL; INTRAMUSCULAR; INTRAVENOUS; SOFT TISSUE
Status: COMPLETED | OUTPATIENT
Start: 2020-08-25 | End: 2020-08-25

## 2020-08-25 RX ORDER — ATORVASTATIN CALCIUM 20 MG/1
TABLET, FILM COATED ORAL
COMMUNITY
End: 2020-10-22 | Stop reason: SDUPTHER

## 2020-08-25 RX ORDER — COLCHICINE 0.6 MG/1
0.6 TABLET ORAL DAILY
Qty: 30 TABLET | Refills: 11 | Status: SHIPPED | OUTPATIENT
Start: 2020-08-25 | End: 2021-01-27

## 2020-08-25 RX ADMIN — DEXAMETHASONE SODIUM PHOSPHATE 8 MG: 4 INJECTION, SOLUTION INTRA-ARTICULAR; INTRALESIONAL; INTRAMUSCULAR; INTRAVENOUS; SOFT TISSUE at 09:08

## 2020-08-25 NOTE — PROGRESS NOTES
"HISTORY OF PRESENT ILLNESS:  Ranjith Peguero is a 44 y.o. male who presents to the clinic today for Gout (left foot 1 day)    Last seen by Dr. Corbin 7/29/20.    Acute gout attack  Left foot pain started yesterday.  Primarily First MTP with some radiation.  Took colchicine 1.2 mg then repeated 0.6 mg an hour later.  However, pain progressed.  Could not even sleep last night due to the pain described as "13/10".  He has not been taking colchicine for gout prophylaxis.  On 4/4/19: Cr 0.9, eGFR >60, uric acid 7.3.    PAST MEDICAL HISTORY:  Past Medical History:   Diagnosis Date    Gout     L big toe    Hyperlipidemia     Hypertension        PAST SURGICAL HISTORY:  No past surgical history on file.    SOCIAL HISTORY:  Social History     Socioeconomic History    Marital status:      Spouse name: Not on file    Number of children: Not on file    Years of education: Not on file    Highest education level: Not on file   Occupational History    Not on file   Social Needs    Financial resource strain: Not on file    Food insecurity     Worry: Not on file     Inability: Not on file    Transportation needs     Medical: Not on file     Non-medical: Not on file   Tobacco Use    Smoking status: Never Smoker    Smokeless tobacco: Never Used   Substance and Sexual Activity    Alcohol use: Yes    Drug use: No    Sexual activity: Yes     Partners: Female   Lifestyle    Physical activity     Days per week: Not on file     Minutes per session: Not on file    Stress: Not on file   Relationships    Social connections     Talks on phone: Not on file     Gets together: Not on file     Attends Spiritism service: Not on file     Active member of club or organization: Not on file     Attends meetings of clubs or organizations: Not on file     Relationship status: Not on file   Other Topics Concern    Not on file   Social History Narrative    [employer] @ [employer].  .  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         FAMILY " "HISTORY:  Family History   Problem Relation Age of Onset    Hypertension Mother     Hypertension Father        ALLERGIES AND MEDICATIONS: updated and reviewed.  Review of patient's allergies indicates:  No Known Allergies  Medication List with Changes/Refills   Current Medications    AMLODIPINE-BENAZEPRIL 5-20 MG (LOTREL) 5-20 MG PER CAPSULE    Take 1 capsule by mouth once daily.    ATORVASTATIN (LIPITOR) 10 MG TABLET    TAKE 1 TABLET BY MOUTH EVERY DAY    COLCHICINE (COLCRYS) 0.6 MG TABLET    Take 1 tablet (0.6 mg total) by mouth once daily.    DICLOFENAC (VOLTAREN) 50 MG EC TABLET    Take 1 tablet (50 mg total) by mouth 2 (two) times daily.    SILDENAFIL (REVATIO) 20 MG TAB    Take 2-5 tablets one hour prior to sex          CARE TEAM:  Patient Care Team:  Sukumar Corbin MD as PCP - General (Family Medicine)  Sukumar Corbin MD as PCP - [facility] Attributed  ANANT Rodriguez MA as Care Coordinator         REVIEW OF SYSTEMS:  Review of Systems   Constitutional: Negative for fatigue and fever.   HENT: Negative for congestion and postnasal drip.    Eyes: Negative for photophobia and visual disturbance.   Respiratory: Negative for cough and shortness of breath.    Cardiovascular: Negative for chest pain and palpitations.   Gastrointestinal: Negative for abdominal pain, nausea and vomiting.   Genitourinary: Negative for dysuria and frequency.   Musculoskeletal: Positive for arthralgias (left MTP) and joint swelling (left MTP). Negative for back pain.   Neurological: Negative for light-headedness and headaches.   Psychiatric/Behavioral: Negative for dysphoric mood. The patient is not nervous/anxious.          PHYSICAL EXAM:  Vitals:    08/25/20 0847   BP: 124/78   HT 5'10"   lb              Physical Examination: General appearance - alert, well appearing, and in no distress and oriented to person, place, and time  Mental status - normal mood, behavior, speech, dress, motor activity, and " thought processes  Eyes - sclera anicteric, left eye normal, right eye normal  Chest - no tachypnea, retractions or cyanosis  Neurological - alert, oriented, normal speech, no focal findings or movement disorder noted, motor and sensory grossly normal bilaterally  Extremities - peripheral pulses normal, no pedal edema, no clubbing or cyanosis, redness and swelling noted to left 1st MTP  Skin - no rashes, redness and swelling noted to left 1st MTP       ASSESSMENT AND PLAN:  Acute idiopathic gout of left foot  -     Comprehensive metabolic panel; Future; Expected date: 08/25/2020  -     indomethacin (INDOCIN) 50 MG capsule; Take 1 capsule (50 mg total) by mouth 3 (three) times daily with meals.  Dispense: 15 capsule; Refill: 1  -     dexamethasone injection 8 mg  -     colchicine (COLCRYS) 0.6 mg tablet; Take 1 tablet (0.6 mg total) by mouth once daily.  Dispense: 30 tablet; Refill: 11  - Advised about potential GI and renal side effects of NSAIDs.  - Printed educational material provided to patient.    Chronic idiopathic gout involving toe of left foot without tophus  -     Comprehensive metabolic panel; Future; Expected date: 08/25/2020  -     colchicine (COLCRYS) 0.6 mg tablet; Take 1 tablet (0.6 mg total) by mouth once daily.  Dispense: 30 tablet; Refill: 11         Paperwork was completed for one week excuse from work.  Follow up Dr. Corbin in one month or sooner as needed.

## 2020-08-25 NOTE — PATIENT INSTRUCTIONS
Treating Gout Attacks     Raising the joint above the level of your heart can help reduce gout symptoms.     Gout is a disease that affects the joints. It is caused by excess uric acid in your blood stream that may lead to crystals forming in your joints. Left untreated, it can lead to painful foot and joint deformities and even kidney problems. But, by treating gout early, you can relieve pain and help prevent future problems. Gout can usually be treated with medication and proper diet. In severe cases, surgery may be needed.  Gout attacks are painful and often happen more than once. Taking medications may reduce pain and prevent attacks in the future. There are also some things you can do at home to relieve symptoms.  Medications for gout  Your healthcare provider may prescribe a daily medication to reduce levels of uric acid. Reducing your uric acid levels may help prevent gout attacks. Allopurinol is one commonly used medication taken daily to reduce uric acid levels. Other medications can help relieve pain and swelling during an acute attack. Medicines such as NSAIDs (nonsteroidal anti-inflammatory medicines), steroids, and colchicine may be prescribed for intermittent use to relieve an acute gout attack. Be sure to take your medication as directed.  What you can do  Below are some things you can do at home to relieve gout symptoms. Your healthcare provider may have other tips.  · Rest the painful joint as much as you can.  · Raise the painful joint so it is at a level higher than your heart.  · Use ice for 10 minutes every 1-2 hours as possible.  How can I prevent gout?  With a little effort, you may be able to prevent gout attacks in the future. Here are some things you can do:  · Avoid foods high in purines  ¨ Certain meats (red meat, processed meat, turkey)  ¨ Organ meats (kidney, liver, sweetbread)  ¨ Shellfish (lobster, crab, shrimp, scallop, mussel)  ¨ Certain fish (anchovy, sardine, herring,  mackerel)  · Take any medications prescribed by your healthcare provider.  · Lose weight if you need to.  · Reduce high fructose corn syrup in meals and drinks.  · Reduce or eliminate consumption of alcohol, particularly beer, but also red wine and spirits.  · Control blood pressure, diabetes, and cholesterol.  · Drink plenty of water to help flush uric acid from your body.  Date Last Reviewed: 2/1/2016 © 2000-2017 [employer]. [address] [place] [address], [place] [address]. All rights reserved. This information is not intended as a substitute for professional medical care. Always follow your healthcare professional's instructions.        Eating to Prevent Gout  Gout is a painful form of arthritis caused by an excess of uric acid. This is a waste product made by the body. It builds up in the body and forms crystals that collect in the joints, bringing on a gout attack. Alcohol and certain foods can trigger a gout attack. Below are some guidelines for changing your diet to help you manage gout. Your healthcare provider can work with you to determine the best eating plan for you. Know that diet is only one part of managing gout. Take your medicines as prescribed and follow the other guidelines your healthcare provider has given you.  Foods to limit  Eating too many foods containing purines may increase the levels of uric acid in your body and increase your risk for a gout attack. It may be best to limit these high-purine foods:  · Alcohol (beer, red wine). You may be told to avoid alcohol completely.  · Certain fish (anchovies, sardines, fish roes, herring, tuna, mussels, codfish, scallops, trout, and artemio)  · Certain meats (red meat, processed meat, graves, turkey, wild game, and goose)  · Sauces and gravies made with meat  · Organ meats (such as liver, kidneys, sweetbreads, and tripe)  · Legumes (such as dried beans, peas)  · Mushrooms, spinach, asparagus, and cauliflower  · Yeast and yeast extract  supplements  Foods to try  Some foods may be helpful for people with gout. You may want to try adding some of the following foods to your diet:  · Dark berries: These include blueberries, blackberries, and cherries. These berries contain chemicals that may lower uric acid.  · Tofu: Tofu, which is made from soy, is a good source of protein. Studies have shown that it may be a better choice than meat for people with gout.  · Omega fatty acids: These acids are found in fatty fish (such as salmon), certain oils (such as flax, olive, or nut oils), or nuts. They may help prevent inflammation due to gout.  The following guidelines are recommended by the American Medical Association for people with gout. Your diet should be:  · High in fiber, whole grains, fruits, and vegetables.  · Low in protein (15% of calories should come from protein. Choose lean sources such as soy, lean meats, and poultry).  · Low in fat (no more than 30% of calories should come from fat, with only 10% coming from animal fat).   Date Last Reviewed: 6/17/2015  © 5791-4360 [employer]. [address] [place] [address]. All rights reserved. This information is not intended as a substitute for professional medical care. Always follow your healthcare professional's instructions.        Indomethacin capsules  What is this medicine?  INDOMETHACIN (in martínez METH a sin) is a non-steroidal anti-inflammatory drug (NSAID). It is used to reduce swelling and to treat pain. It may be used for painful joint and muscular problems such as arthritis, tendinitis, bursitis, and gout.  How should I use this medicine?  Take this medicine by mouth with food and with a full glass of water. Follow the directions on the prescription label. Take your medicine at regular intervals. Do not take your medicine more often than directed. Long-term, continuous use may increase the risk of heart attack or stroke.  A special MedGuide will be given to you by the  pharmacist with each prescription and refill. Be sure to read this information carefully each time.  Talk to your pediatrician regarding the use of this medicine in children. Special care may be needed. While this drug may be prescribed for children as young as 15 years for selected conditions, precautions do apply.  Elderly patients over 65 years old may have a stronger reaction and need a smaller dose.  What side effects may I notice from receiving this medicine?  Side effects that you should report to your doctor or health care professional as soon as possible:  · allergic reactions like skin rash, itching or hives, swelling of the face, lips, or tongue  · difficulty breathing or wheezing  · nausea, vomiting  · signs and symptoms of bleeding such as bloody or black, tarry stools; red or dark-brown urine; spitting up blood or brown material that looks like coffee grounds; red spots on the skin; unusual bruising or bleeding from the eye, gums, or nose  · signs and symptoms of a blood clot such as changes in vision; chest pain; severe, sudden headache; trouble speaking; sudden numbness or weakness of the face, arm, or leg; trouble walking  · unexplained weight gain or swelling  · unusually weak or tired  · yellowing of eyes or skin  Side effects that usually do not require medical attention (report to your doctor or health care professional if they continue or are bothersome):  · diarrhea  · dizziness  · headache  · heartburn  What may interact with this medicine?  Do not take this medicine with any of the following medications:  · cidofovir  · diflunisal  · ketorolac  · methotrexate  · pemetrexed  · triamterene  This medicine may also interact with the following medications:  · alcohol  · antacids  · aspirin and aspirin-like medicines  · cyclosporine  · digoxin  · diuretics  · lithium  · medicines for diabetes  · medicines for high blood pressure  · medicines that affect platelets  · medicines that treat or prevent  blood clots like warfarin  · NSAIDs, medicines for pain and inflammation, like ibuprofen or naproxen  · probenecid  · steroid medicines like prednisone or cortisone  What if I miss a dose?  If you miss a dose, take it as soon as you can. If it is almost time for your next dose, take only that dose. Do not take double or extra doses.  Where should I keep my medicine?  Keep out of the reach of children.  Store at room temperature between 15 and 30 degrees C (59 and 86 degrees F). Keep container tightly closed. Throw away any unused medicine after the expiration date.  What should I tell my health care provider before I take this medicine?  They need to know if you have any of these conditions:  · asthma, especially aspirin sensitive asthma  · coronary artery bypass graft (CABG) surgery within the past 2 weeks  · depression  · drink more than 3 alcohol containing drinks a day  · heart disease or circulation problems like heart failure or leg edema (fluid retention)  · high blood pressure  · kidney disease  · liver disease  · Parkinson's disease  · seizures  · stomach bleeding or ulcers  · an unusual or allergic reaction to indomethacin, aspirin, other NSAIDs, other medicines, foods, dyes, or preservatives  · pregnant or trying to get pregnant  · breast-feeding  What should I watch for while using this medicine?  Tell your doctor or health care professional if your pain does not get better. Talk to your doctor before taking another medicine for pain. Do not treat yourself.  This medicine does not prevent heart attack or stroke. In fact, this medicine may increase the chance of a heart attack or stroke. The chance may increase with longer use of this medicine and in people who have heart disease. If you take aspirin to prevent heart attack or stroke, talk with your doctor or health care professional.  Do not take medicines such as ibuprofen and naproxen with this medicine. Side effects such as stomach upset, nausea, or  ulcers may be more likely to occur. Many medicines available without a prescription should not be taken with this medicine.  This medicine can cause ulcers and bleeding in the stomach and intestines at any time during treatment. Do not smoke cigarettes or drink alcohol. These increase irritation to your stomach and can make it more susceptible to damage from this medicine. Ulcers and bleeding can happen without warning symptoms and can cause death.  You may get drowsy or dizzy. Do not drive, use machinery, or do anything that needs mental alertness until you know how this medicine affects you. Do not stand or sit up quickly, especially if you are an older patient. This reduces the risk of dizzy or fainting spells.  This medicine can cause you to bleed more easily. Try to avoid damage to your teeth and gums when you brush or floss your teeth.  NOTE:This sheet is a summary. It may not cover all possible information. If you have questions about this medicine, talk to your doctor, pharmacist, or health care provider. Copyright© 2017 [employer]

## 2020-08-26 ENCOUNTER — PATIENT MESSAGE (OUTPATIENT)
Dept: FAMILY MEDICINE | Facility: CLINIC | Age: 45
End: 2020-08-26

## 2020-09-16 ENCOUNTER — HOSPITAL ENCOUNTER (EMERGENCY)
Facility: HOSPITAL | Age: 45
Discharge: HOME | End: 2020-09-16
Attending: EMERGENCY MEDICINE
Payer: COMMERCIAL

## 2020-09-16 ENCOUNTER — NURSE TRIAGE (OUTPATIENT)
Dept: ADMINISTRATIVE | Facility: CLINIC | Age: 45
End: 2020-09-16

## 2020-09-16 VITALS
BODY MASS INDEX: 30.8 KG/M2 | RESPIRATION RATE: 16 BRPM | DIASTOLIC BLOOD PRESSURE: 108 MMHG | HEIGHT: 71 IN | HEART RATE: 81 BPM | TEMPERATURE: 98 F | SYSTOLIC BLOOD PRESSURE: 155 MMHG | OXYGEN SATURATION: 96 % | WEIGHT: 220 LBS

## 2020-09-16 DIAGNOSIS — S01.111A LACERATION OF RIGHT EYEBROW, INITIAL ENCOUNTER: Primary | ICD-10-CM

## 2020-09-16 PROCEDURE — 63600175 PHARM REV CODE 636 W HCPCS: Performed by: PHYSICIAN ASSISTANT

## 2020-09-16 PROCEDURE — 12011 RPR F/E/E/N/L/M 2.5 CM/<: CPT

## 2020-09-16 PROCEDURE — 90715 TDAP VACCINE 7 YRS/> IM: CPT | Performed by: PHYSICIAN ASSISTANT

## 2020-09-16 PROCEDURE — 99284 EMERGENCY DEPT VISIT MOD MDM: CPT | Mod: 25

## 2020-09-16 PROCEDURE — 90471 IMMUNIZATION ADMIN: CPT | Performed by: PHYSICIAN ASSISTANT

## 2020-09-16 RX ADMIN — CLOSTRIDIUM TETANI TOXOID ANTIGEN (FORMALDEHYDE INACTIVATED), CORYNEBACTERIUM DIPHTHERIAE TOXOID ANTIGEN (FORMALDEHYDE INACTIVATED), BORDETELLA PERTUSSIS TOXOID ANTIGEN (GLUTARALDEHYDE INACTIVATED), BORDETELLA PERTUSSIS FILAMENTOUS HEMAGGLUTININ ANTIGEN (FORMALDEHYDE INACTIVATED), BORDETELLA PERTUSSIS PERTACTIN ANTIGEN, AND BORDETELLA PERTUSSIS FIMBRIAE 2/3 ANTIGEN 0.5 ML: 5; 2; 2.5; 5; 3; 5 INJECTION, SUSPENSION INTRAMUSCULAR at 09:09

## 2020-09-16 NOTE — ED TRIAGE NOTES
Tripped and fell at 2200 hrs last night approx 3 cm laceration above right eyebrow bleeding controlled not ko'd or dizziness

## 2020-09-16 NOTE — ED PROVIDER NOTES
Encounter Date: 9/16/2020       History     Chief Complaint   Patient presents with    Fall     Pt reports he stumbled and hit forehead on table around 2200 last night. Denies LOC, dizziness, N/V, blurred vision. Pt presents with laceration to forehead, bleeding controlled. Pt c/o head pain, rated 4-5/10    Laceration     44-year-old male with history of hypertension presents to the emergency department for laceration to the right eyebrow that occurred last night after mechanical trip and fall very hit his right eyebrow on a table around 10:00 p.m. last night.  Denies LOC, visual disturbance, neck pain, nausea, vomiting, and other injury.  Unknown last tetanus.  No medications taken prior to arrival.        Review of patient's allergies indicates:  No Known Allergies  Past Medical History:   Diagnosis Date    Gout     L big toe    Hyperlipidemia     Hypertension      No past surgical history on file.  Family History   Problem Relation Age of Onset    Hypertension Mother     Hypertension Father      Social History     Tobacco Use    Smoking status: Never Smoker    Smokeless tobacco: Never Used   Substance Use Topics    Alcohol use: Yes    Drug use: No     Review of Systems   Constitutional: Negative for fever.   HENT: Negative for congestion, sore throat and trouble swallowing.    Respiratory: Negative for cough and shortness of breath.    Cardiovascular: Negative for chest pain.   Gastrointestinal: Negative for abdominal pain, constipation, diarrhea, nausea and vomiting.   Genitourinary: Negative for dysuria, flank pain, frequency and urgency.   Musculoskeletal: Negative for back pain.   Skin: Positive for wound. Negative for rash.   Neurological: Negative for headaches.   All other systems reviewed and are negative.      Physical Exam     Initial Vitals [09/16/20 0906]   BP Pulse Resp Temp SpO2   (!) 179/114 83 18 98 °F (36.7 °C) 97 %      MAP       --         Physical Exam    Nursing note and vitals  reviewed.  Constitutional: He appears well-developed and well-nourished. He is not diaphoretic. No distress.   HENT:   Head: Normocephalic.   Nose: Nose normal.   1 cm laceration to the right eyebrow that has begun to form a small amount of granulation tissue.  There is no active bleeding.  No erythema or significant tenderness.  No midline tenderness of the cervical spine.  Full ROM of extraocular movements without pain.  No photophobia.   Eyes: Conjunctivae and EOM are normal. Pupils are equal, round, and reactive to light. Right eye exhibits no discharge. Left eye exhibits no discharge.   Neck: Normal range of motion. No tracheal deviation present. No JVD present.   Cardiovascular: Normal rate, regular rhythm and normal heart sounds. Exam reveals no friction rub.    No murmur heard.  Pulmonary/Chest: Breath sounds normal. No stridor. No respiratory distress. He has no wheezes. He has no rhonchi. He has no rales. He exhibits no tenderness.   Musculoskeletal: Normal range of motion.   Neurological: He is alert and oriented to person, place, and time.   Skin: Skin is warm and dry. No rash and no abscess noted. No erythema. No pallor.         ED Course   Lac Repair    Date/Time: 9/16/2020 9:35 AM  Performed by: Jerel Hernandes PA-C  Authorized by: Mary Ann Miller MD   Consent Done: Yes  Consent: Verbal consent obtained.  Consent given by: patient  Location: Right eyebrow.  Laceration length: 1 cm  Foreign bodies: no foreign bodies  Tendon involvement: none  Nerve involvement: none  Vascular damage: no  Patient sedated: no  Preparation: Patient was prepped and draped in the usual sterile fashion.  Irrigation solution: saline  Irrigation method: jet lavage  Amount of cleaning: standard  Debridement: none  Degree of undermining: none  Skin closure: glue  Approximation: close  Approximation difficulty: complex (Due to granulation tissue already forming)  Comments: Patient understands that his wound is  approximately 12 hr old and is more complicated to approximate and may dehisce.  He understands that he is at a slightly increased risk of infection.        Labs Reviewed - No data to display       Imaging Results    None          Medical Decision Making:   History:   Old Medical Records: I decided to obtain old medical records.  ED Management:  Wound almost 12 hr old.  Given location to the face, I do offer patient wound repair in the ED verses healing by secondary intention and repair at a later date; patient would prefer repair in ED today with understanding of inherit risks of complications.  Approximated well with Steri-Strips and glue.  No active infection today.  No evidence of entrapment or ocular injury.  Nexus C-spine negative.  Maury head CT negative.  We discuss wound care and advised follow-up with PCP in 1 week for re-evaluation.  Tetanus updated.  Strict return precautions discussed with patient who is agreeable to the plan.                             Clinical Impression:       ICD-10-CM ICD-9-CM   1. Laceration of right eyebrow, initial encounter  S01.111A 873.42                          ED Disposition Condition    Discharge Stable        ED Prescriptions     None        Follow-up Information     Follow up With Specialties Details Why Contact Info    Sukumar Corbin MD Family Medicine, Wound Care Schedule an appointment as soon as possible for a visit in 1 week For re-evaluation [address] [facility] [address]  [phone]      [facility] Emergency [facility] Go to  If symptoms worsen [id number]  [facility] [id number] [phone]                                       Jerel Hernandes PA-C  09/16/20 5708

## 2020-09-16 NOTE — TELEPHONE ENCOUNTER
"Patient was walking through his house and tripped on the front of his slipper. He hit his head on the table and it resulted in bleeding and a 1-2 inch 'gash to the forehead' the patient said the bleeding finally stopped however the gash is 'pretty deep'  Patient is advised to the ER now as per protocol.  Reason for Disposition   Skin is split open or gaping  (or length > 1/2 inch or 12 mm)    Additional Information   Negative: [1] ACUTE NEURO SYMPTOM AND [2] present now  (DEFINITION: difficult to awaken OR confused thinking and talking OR slurred speech OR weakness of arms OR unsteady walking)   Negative: Knocked out (unconscious) > 1 minute   Negative: Seizure (convulsion) occurred  (Exception: prior history of seizures and now alert and without Acute Neuro Symptoms)   Negative: Penetrating head injury (e.g., knife, gun shot wound, metal object)   Negative: [1] Major bleeding (e.g., actively dripping or spurting) AND [2] can't be stopped   Negative: [1] Dangerous mechanism of injury (e.g., MVA, diving, trampoline, contact sports, fall > 10 feet or 3 meters) AND [2] NECK pain AND [3] began < 1 hour after injury   Negative: Sounds like a life-threatening emergency to the triager   Negative: [1] Diagnosed with concussion AND [2] within last 14 days   Negative: [1] Traumatic brain injury (mTBI; concussion) AND [2] more than 14 days since head injury   Negative: Can't remember what happened (amnesia)     Can't remember if head hit the floor   Negative: Vomiting once or more   Negative: [1] Loss of vision or double vision AND [2] present now   Negative: Watery or blood-tinged fluid dripping from the NOSE or EARS now  (Exception: tears from crying or nosebleed from nasal trauma)   Negative: [1] One or two "black eyes" (bruising, purple color of eyelids) AND [2] onset within 24 hours of head injury   Negative: Large swelling or bruise > 2 inches (5 cm)    Protocols used: HEAD INJURY-A-AH    "

## 2020-10-20 DIAGNOSIS — I10 ESSENTIAL HYPERTENSION: ICD-10-CM

## 2020-10-20 RX ORDER — AMLODIPINE AND BENAZEPRIL HYDROCHLORIDE 5; 20 MG/1; MG/1
1 CAPSULE ORAL DAILY
Qty: 90 CAPSULE | Refills: 1 | Status: SHIPPED | OUTPATIENT
Start: 2020-10-20 | End: 2021-04-20

## 2020-10-22 RX ORDER — ATORVASTATIN CALCIUM 20 MG/1
10 TABLET, FILM COATED ORAL DAILY
Qty: 90 TABLET | Refills: 1 | Status: SHIPPED | OUTPATIENT
Start: 2020-10-22 | End: 2020-11-24 | Stop reason: SDUPTHER

## 2020-11-24 RX ORDER — ATORVASTATIN CALCIUM 20 MG/1
10 TABLET, FILM COATED ORAL DAILY
Qty: 90 TABLET | Refills: 1 | Status: SHIPPED | OUTPATIENT
Start: 2020-11-24

## 2021-01-27 ENCOUNTER — OFFICE VISIT (OUTPATIENT)
Dept: FAMILY MEDICINE | Facility: CLINIC | Age: 46
End: 2021-01-27
Payer: COMMERCIAL

## 2021-01-27 VITALS
WEIGHT: 227.06 LBS | HEART RATE: 87 BPM | TEMPERATURE: 99 F | SYSTOLIC BLOOD PRESSURE: 130 MMHG | OXYGEN SATURATION: 96 % | HEIGHT: 71 IN | BODY MASS INDEX: 31.79 KG/M2 | RESPIRATION RATE: 20 BRPM | DIASTOLIC BLOOD PRESSURE: 84 MMHG

## 2021-01-27 DIAGNOSIS — M10.9 ACUTE GOUT INVOLVING TOE OF LEFT FOOT, UNSPECIFIED CAUSE: Primary | ICD-10-CM

## 2021-01-27 PROCEDURE — 99214 OFFICE O/P EST MOD 30 MIN: CPT | Mod: 25,S$GLB,, | Performed by: NURSE PRACTITIONER

## 2021-01-27 PROCEDURE — 3079F DIAST BP 80-89 MM HG: CPT | Mod: CPTII,S$GLB,, | Performed by: NURSE PRACTITIONER

## 2021-01-27 PROCEDURE — 3008F PR BODY MASS INDEX (BMI) DOCUMENTED: ICD-10-PCS | Mod: CPTII,S$GLB,, | Performed by: NURSE PRACTITIONER

## 2021-01-27 PROCEDURE — 3079F PR MOST RECENT DIASTOLIC BLOOD PRESSURE 80-89 MM HG: ICD-10-PCS | Mod: CPTII,S$GLB,, | Performed by: NURSE PRACTITIONER

## 2021-01-27 PROCEDURE — 99214 PR OFFICE/OUTPT VISIT, EST, LEVL IV, 30-39 MIN: ICD-10-PCS | Mod: 25,S$GLB,, | Performed by: NURSE PRACTITIONER

## 2021-01-27 PROCEDURE — 3075F PR MOST RECENT SYSTOLIC BLOOD PRESS GE 130-139MM HG: ICD-10-PCS | Mod: CPTII,S$GLB,, | Performed by: NURSE PRACTITIONER

## 2021-01-27 PROCEDURE — 1125F AMNT PAIN NOTED PAIN PRSNT: CPT | Mod: S$GLB,,, | Performed by: NURSE PRACTITIONER

## 2021-01-27 PROCEDURE — 99999 PR PBB SHADOW E&M-EST. PATIENT-LVL IV: CPT | Mod: PBBFAC,,, | Performed by: NURSE PRACTITIONER

## 2021-01-27 PROCEDURE — 96372 THER/PROPH/DIAG INJ SC/IM: CPT | Mod: S$GLB,,, | Performed by: NURSE PRACTITIONER

## 2021-01-27 PROCEDURE — 3008F BODY MASS INDEX DOCD: CPT | Mod: CPTII,S$GLB,, | Performed by: NURSE PRACTITIONER

## 2021-01-27 PROCEDURE — 1125F PR PAIN SEVERITY QUANTIFIED, PAIN PRESENT: ICD-10-PCS | Mod: S$GLB,,, | Performed by: NURSE PRACTITIONER

## 2021-01-27 PROCEDURE — 96372 PR INJECTION,THERAP/PROPH/DIAG2ST, IM OR SUBCUT: ICD-10-PCS | Mod: S$GLB,,, | Performed by: NURSE PRACTITIONER

## 2021-01-27 PROCEDURE — 99999 PR PBB SHADOW E&M-EST. PATIENT-LVL IV: ICD-10-PCS | Mod: PBBFAC,,, | Performed by: NURSE PRACTITIONER

## 2021-01-27 PROCEDURE — 3075F SYST BP GE 130 - 139MM HG: CPT | Mod: CPTII,S$GLB,, | Performed by: NURSE PRACTITIONER

## 2021-01-27 RX ORDER — COLCHICINE 0.6 MG/1
TABLET ORAL
Qty: 30 TABLET | Refills: 0 | Status: SHIPPED | OUTPATIENT
Start: 2021-01-27 | End: 2021-04-12 | Stop reason: SDUPTHER

## 2021-03-31 ENCOUNTER — OFFICE VISIT (OUTPATIENT)
Dept: FAMILY MEDICINE | Facility: CLINIC | Age: 46
End: 2021-03-31
Payer: COMMERCIAL

## 2021-03-31 VITALS
TEMPERATURE: 98 F | HEIGHT: 71 IN | RESPIRATION RATE: 16 BRPM | DIASTOLIC BLOOD PRESSURE: 96 MMHG | SYSTOLIC BLOOD PRESSURE: 138 MMHG | BODY MASS INDEX: 31.48 KG/M2 | WEIGHT: 224.88 LBS | HEART RATE: 93 BPM | OXYGEN SATURATION: 96 %

## 2021-03-31 DIAGNOSIS — M1A.0720 CHRONIC IDIOPATHIC GOUT INVOLVING TOE OF LEFT FOOT WITHOUT TOPHUS: ICD-10-CM

## 2021-03-31 DIAGNOSIS — I10 ESSENTIAL HYPERTENSION: ICD-10-CM

## 2021-03-31 DIAGNOSIS — Z00.00 VISIT FOR WELL MAN HEALTH CHECK: Primary | ICD-10-CM

## 2021-03-31 DIAGNOSIS — E66.09 CLASS 1 OBESITY DUE TO EXCESS CALORIES WITH SERIOUS COMORBIDITY AND BODY MASS INDEX (BMI) OF 31.0 TO 31.9 IN ADULT: ICD-10-CM

## 2021-03-31 PROCEDURE — 3075F SYST BP GE 130 - 139MM HG: CPT | Mod: CPTII,S$GLB,, | Performed by: FAMILY MEDICINE

## 2021-03-31 PROCEDURE — 99396 PR PREVENTIVE VISIT,EST,40-64: ICD-10-PCS | Mod: S$GLB,,, | Performed by: FAMILY MEDICINE

## 2021-03-31 PROCEDURE — 1125F AMNT PAIN NOTED PAIN PRSNT: CPT | Mod: S$GLB,,, | Performed by: FAMILY MEDICINE

## 2021-03-31 PROCEDURE — 3075F PR MOST RECENT SYSTOLIC BLOOD PRESS GE 130-139MM HG: ICD-10-PCS | Mod: CPTII,S$GLB,, | Performed by: FAMILY MEDICINE

## 2021-03-31 PROCEDURE — 99396 PREV VISIT EST AGE 40-64: CPT | Mod: S$GLB,,, | Performed by: FAMILY MEDICINE

## 2021-03-31 PROCEDURE — 3008F PR BODY MASS INDEX (BMI) DOCUMENTED: ICD-10-PCS | Mod: CPTII,S$GLB,, | Performed by: FAMILY MEDICINE

## 2021-03-31 PROCEDURE — 3080F DIAST BP >= 90 MM HG: CPT | Mod: CPTII,S$GLB,, | Performed by: FAMILY MEDICINE

## 2021-03-31 PROCEDURE — 3080F PR MOST RECENT DIASTOLIC BLOOD PRESSURE >= 90 MM HG: ICD-10-PCS | Mod: CPTII,S$GLB,, | Performed by: FAMILY MEDICINE

## 2021-03-31 PROCEDURE — 1125F PR PAIN SEVERITY QUANTIFIED, PAIN PRESENT: ICD-10-PCS | Mod: S$GLB,,, | Performed by: FAMILY MEDICINE

## 2021-03-31 PROCEDURE — 99999 PR PBB SHADOW E&M-EST. PATIENT-LVL III: ICD-10-PCS | Mod: PBBFAC,,, | Performed by: FAMILY MEDICINE

## 2021-03-31 PROCEDURE — 3008F BODY MASS INDEX DOCD: CPT | Mod: CPTII,S$GLB,, | Performed by: FAMILY MEDICINE

## 2021-03-31 PROCEDURE — 99999 PR PBB SHADOW E&M-EST. PATIENT-LVL III: CPT | Mod: PBBFAC,,, | Performed by: FAMILY MEDICINE

## 2021-03-31 RX ORDER — PREDNISONE 20 MG/1
40 TABLET ORAL DAILY
Qty: 8 TABLET | Refills: 0 | Status: SHIPPED | OUTPATIENT
Start: 2021-03-31 | End: 2021-04-04

## 2021-04-07 ENCOUNTER — LAB VISIT (OUTPATIENT)
Dept: LAB | Facility: HOSPITAL | Age: 46
End: 2021-04-07
Attending: FAMILY MEDICINE
Payer: COMMERCIAL

## 2021-04-07 DIAGNOSIS — E66.09 CLASS 1 OBESITY DUE TO EXCESS CALORIES WITH SERIOUS COMORBIDITY AND BODY MASS INDEX (BMI) OF 31.0 TO 31.9 IN ADULT: ICD-10-CM

## 2021-04-07 DIAGNOSIS — M1A.0720 CHRONIC IDIOPATHIC GOUT INVOLVING TOE OF LEFT FOOT WITHOUT TOPHUS: ICD-10-CM

## 2021-04-07 DIAGNOSIS — I10 ESSENTIAL HYPERTENSION: ICD-10-CM

## 2021-04-07 DIAGNOSIS — Z00.00 VISIT FOR WELL MAN HEALTH CHECK: ICD-10-CM

## 2021-04-07 DIAGNOSIS — M10.9 ACUTE GOUT INVOLVING TOE OF LEFT FOOT, UNSPECIFIED CAUSE: ICD-10-CM

## 2021-04-07 LAB
ALBUMIN SERPL BCP-MCNC: 4.4 G/DL (ref 3.5–5.2)
ALP SERPL-CCNC: 65 U/L (ref 55–135)
ALT SERPL W/O P-5'-P-CCNC: 88 U/L (ref 10–44)
ANION GAP SERPL CALC-SCNC: 12 MMOL/L (ref 8–16)
AST SERPL-CCNC: 49 U/L (ref 10–40)
BASOPHILS # BLD AUTO: 0.03 K/UL (ref 0–0.2)
BASOPHILS NFR BLD: 0.5 % (ref 0–1.9)
BILIRUB SERPL-MCNC: 0.9 MG/DL (ref 0.1–1)
BUN SERPL-MCNC: 14 MG/DL (ref 6–20)
CALCIUM SERPL-MCNC: 9.5 MG/DL (ref 8.7–10.5)
CHLORIDE SERPL-SCNC: 103 MMOL/L (ref 95–110)
CHOLEST SERPL-MCNC: 220 MG/DL (ref 120–199)
CHOLEST/HDLC SERPL: 3.6 {RATIO} (ref 2–5)
CO2 SERPL-SCNC: 25 MMOL/L (ref 23–29)
CREAT SERPL-MCNC: 0.9 MG/DL (ref 0.5–1.4)
DIFFERENTIAL METHOD: ABNORMAL
EOSINOPHIL # BLD AUTO: 0.2 K/UL (ref 0–0.5)
EOSINOPHIL NFR BLD: 2.7 % (ref 0–8)
ERYTHROCYTE [DISTWIDTH] IN BLOOD BY AUTOMATED COUNT: 13.7 % (ref 11.5–14.5)
EST. GFR  (AFRICAN AMERICAN): >60 ML/MIN/1.73 M^2
EST. GFR  (NON AFRICAN AMERICAN): >60 ML/MIN/1.73 M^2
GLUCOSE SERPL-MCNC: 110 MG/DL (ref 70–110)
HCT VFR BLD AUTO: 42.5 % (ref 40–54)
HDLC SERPL-MCNC: 61 MG/DL (ref 40–75)
HDLC SERPL: 27.7 % (ref 20–50)
HGB BLD-MCNC: 14.5 G/DL (ref 14–18)
IMM GRANULOCYTES # BLD AUTO: 0.04 K/UL (ref 0–0.04)
IMM GRANULOCYTES NFR BLD AUTO: 0.6 % (ref 0–0.5)
LDLC SERPL CALC-MCNC: 102 MG/DL (ref 63–159)
LYMPHOCYTES # BLD AUTO: 1.8 K/UL (ref 1–4.8)
LYMPHOCYTES NFR BLD: 29.1 % (ref 18–48)
MCH RBC QN AUTO: 30.5 PG (ref 27–31)
MCHC RBC AUTO-ENTMCNC: 34.1 G/DL (ref 32–36)
MCV RBC AUTO: 89 FL (ref 82–98)
MONOCYTES # BLD AUTO: 0.7 K/UL (ref 0.3–1)
MONOCYTES NFR BLD: 11.3 % (ref 4–15)
NEUTROPHILS # BLD AUTO: 3.5 K/UL (ref 1.8–7.7)
NEUTROPHILS NFR BLD: 55.8 % (ref 38–73)
NONHDLC SERPL-MCNC: 159 MG/DL
NRBC BLD-RTO: 0 /100 WBC
PLATELET # BLD AUTO: 222 K/UL (ref 150–450)
PMV BLD AUTO: 11.4 FL (ref 9.2–12.9)
POTASSIUM SERPL-SCNC: 4.2 MMOL/L (ref 3.5–5.1)
PROT SERPL-MCNC: 7.9 G/DL (ref 6–8.4)
RBC # BLD AUTO: 4.76 M/UL (ref 4.6–6.2)
SODIUM SERPL-SCNC: 140 MMOL/L (ref 136–145)
TRIGL SERPL-MCNC: 285 MG/DL (ref 30–150)
URATE SERPL-MCNC: 8.7 MG/DL (ref 3.4–7)
WBC # BLD AUTO: 6.22 K/UL (ref 3.9–12.7)

## 2021-04-07 PROCEDURE — 84153 ASSAY OF PSA TOTAL: CPT | Performed by: FAMILY MEDICINE

## 2021-04-07 PROCEDURE — 80053 COMPREHEN METABOLIC PANEL: CPT | Performed by: FAMILY MEDICINE

## 2021-04-07 PROCEDURE — 36415 COLL VENOUS BLD VENIPUNCTURE: CPT | Mod: PN | Performed by: FAMILY MEDICINE

## 2021-04-07 PROCEDURE — 84550 ASSAY OF BLOOD/URIC ACID: CPT | Performed by: FAMILY MEDICINE

## 2021-04-07 PROCEDURE — 80061 LIPID PANEL: CPT | Performed by: FAMILY MEDICINE

## 2021-04-07 PROCEDURE — 85025 COMPLETE CBC W/AUTO DIFF WBC: CPT | Performed by: FAMILY MEDICINE

## 2021-04-08 ENCOUNTER — PATIENT MESSAGE (OUTPATIENT)
Dept: FAMILY MEDICINE | Facility: CLINIC | Age: 46
End: 2021-04-08

## 2021-04-08 LAB — COMPLEXED PSA SERPL-MCNC: 0.82 NG/ML (ref 0–4)

## 2021-04-12 ENCOUNTER — PATIENT MESSAGE (OUTPATIENT)
Dept: FAMILY MEDICINE | Facility: CLINIC | Age: 46
End: 2021-04-12

## 2021-04-12 RX ORDER — COLCHICINE 0.6 MG/1
0.6 TABLET ORAL DAILY
Qty: 30 TABLET | Refills: 3 | Status: SHIPPED | OUTPATIENT
Start: 2021-04-12

## 2021-05-10 ENCOUNTER — OFFICE VISIT (OUTPATIENT)
Dept: FAMILY MEDICINE | Facility: CLINIC | Age: 46
End: 2021-05-10
Payer: COMMERCIAL

## 2021-05-10 ENCOUNTER — PATIENT MESSAGE (OUTPATIENT)
Dept: FAMILY MEDICINE | Facility: CLINIC | Age: 46
End: 2021-05-10

## 2021-05-10 VITALS
WEIGHT: 227.5 LBS | HEIGHT: 71 IN | BODY MASS INDEX: 31.85 KG/M2 | OXYGEN SATURATION: 95 % | HEART RATE: 88 BPM | RESPIRATION RATE: 16 BRPM | TEMPERATURE: 99 F | SYSTOLIC BLOOD PRESSURE: 132 MMHG | DIASTOLIC BLOOD PRESSURE: 84 MMHG

## 2021-05-10 DIAGNOSIS — M79.672 CHRONIC FOOT PAIN, LEFT: ICD-10-CM

## 2021-05-10 DIAGNOSIS — I10 BENIGN ESSENTIAL HTN: Primary | ICD-10-CM

## 2021-05-10 DIAGNOSIS — G89.29 CHRONIC FOOT PAIN, LEFT: ICD-10-CM

## 2021-05-10 DIAGNOSIS — I10 ESSENTIAL HYPERTENSION: ICD-10-CM

## 2021-05-10 DIAGNOSIS — M1A.0720 CHRONIC IDIOPATHIC GOUT INVOLVING TOE OF LEFT FOOT WITHOUT TOPHUS: ICD-10-CM

## 2021-05-10 PROCEDURE — 3079F DIAST BP 80-89 MM HG: CPT | Mod: CPTII,S$GLB,, | Performed by: FAMILY MEDICINE

## 2021-05-10 PROCEDURE — 3075F SYST BP GE 130 - 139MM HG: CPT | Mod: CPTII,S$GLB,, | Performed by: FAMILY MEDICINE

## 2021-05-10 PROCEDURE — 99999 PR PBB SHADOW E&M-EST. PATIENT-LVL IV: CPT | Mod: PBBFAC,,, | Performed by: FAMILY MEDICINE

## 2021-05-10 PROCEDURE — 99999 PR PBB SHADOW E&M-EST. PATIENT-LVL IV: ICD-10-PCS | Mod: PBBFAC,,, | Performed by: FAMILY MEDICINE

## 2021-05-10 PROCEDURE — 99214 OFFICE O/P EST MOD 30 MIN: CPT | Mod: S$GLB,,, | Performed by: FAMILY MEDICINE

## 2021-05-10 PROCEDURE — 3075F PR MOST RECENT SYSTOLIC BLOOD PRESS GE 130-139MM HG: ICD-10-PCS | Mod: CPTII,S$GLB,, | Performed by: FAMILY MEDICINE

## 2021-05-10 PROCEDURE — 3008F PR BODY MASS INDEX (BMI) DOCUMENTED: ICD-10-PCS | Mod: CPTII,S$GLB,, | Performed by: FAMILY MEDICINE

## 2021-05-10 PROCEDURE — 3008F BODY MASS INDEX DOCD: CPT | Mod: CPTII,S$GLB,, | Performed by: FAMILY MEDICINE

## 2021-05-10 PROCEDURE — 3079F PR MOST RECENT DIASTOLIC BLOOD PRESSURE 80-89 MM HG: ICD-10-PCS | Mod: CPTII,S$GLB,, | Performed by: FAMILY MEDICINE

## 2021-05-10 PROCEDURE — 99214 PR OFFICE/OUTPT VISIT, EST, LEVL IV, 30-39 MIN: ICD-10-PCS | Mod: S$GLB,,, | Performed by: FAMILY MEDICINE

## 2021-05-10 RX ORDER — INDOMETHACIN 75 MG/1
CAPSULE, EXTENDED RELEASE ORAL
COMMUNITY
Start: 2020-08-18

## 2021-05-10 RX ORDER — AMLODIPINE AND BENAZEPRIL HYDROCHLORIDE 5; 40 MG/1; MG/1
1 CAPSULE ORAL DAILY
Qty: 30 CAPSULE | Refills: 1 | Status: SHIPPED | OUTPATIENT
Start: 2021-05-10 | End: 2021-05-10

## 2021-05-11 ENCOUNTER — IMMUNIZATION (OUTPATIENT)
Dept: OBSTETRICS AND GYNECOLOGY | Facility: CLINIC | Age: 46
End: 2021-05-11
Payer: COMMERCIAL

## 2021-05-11 DIAGNOSIS — Z23 NEED FOR VACCINATION: Primary | ICD-10-CM

## 2021-05-11 PROCEDURE — 91300 COVID-19, MRNA, LNP-S, PF, 30 MCG/0.3 ML DOSE VACCINE: CPT | Mod: PBBFAC | Performed by: FAMILY MEDICINE

## 2021-05-13 ENCOUNTER — TELEPHONE (OUTPATIENT)
Dept: ADMINISTRATIVE | Facility: HOSPITAL | Age: 46
End: 2021-05-13

## 2021-06-02 ENCOUNTER — IMMUNIZATION (OUTPATIENT)
Dept: OBSTETRICS AND GYNECOLOGY | Facility: CLINIC | Age: 46
End: 2021-06-02
Payer: COMMERCIAL

## 2021-06-02 DIAGNOSIS — Z23 NEED FOR VACCINATION: Primary | ICD-10-CM

## 2021-06-02 PROCEDURE — 0002A COVID-19, MRNA, LNP-S, PF, 30 MCG/0.3 ML DOSE VACCINE: CPT | Mod: PBBFAC | Performed by: FAMILY MEDICINE

## 2021-06-02 PROCEDURE — 91300 COVID-19, MRNA, LNP-S, PF, 30 MCG/0.3 ML DOSE VACCINE: CPT | Mod: PBBFAC | Performed by: FAMILY MEDICINE

## 2021-06-25 ENCOUNTER — OFFICE VISIT (OUTPATIENT)
Dept: FAMILY MEDICINE | Facility: CLINIC | Age: 46
End: 2021-06-25
Payer: COMMERCIAL

## 2021-06-25 ENCOUNTER — PATIENT MESSAGE (OUTPATIENT)
Dept: FAMILY MEDICINE | Facility: CLINIC | Age: 46
End: 2021-06-25

## 2021-06-25 DIAGNOSIS — J32.9 SINUSITIS, BACTERIAL: Primary | ICD-10-CM

## 2021-06-25 DIAGNOSIS — B96.89 SINUSITIS, BACTERIAL: Primary | ICD-10-CM

## 2021-06-25 PROCEDURE — 99213 PR OFFICE/OUTPT VISIT, EST, LEVL III, 20-29 MIN: ICD-10-PCS | Mod: 95,,, | Performed by: FAMILY MEDICINE

## 2021-06-25 PROCEDURE — 99213 OFFICE O/P EST LOW 20 MIN: CPT | Mod: 95,,, | Performed by: FAMILY MEDICINE

## 2021-06-25 RX ORDER — GUAIFENESIN 600 MG/1
600 TABLET, EXTENDED RELEASE ORAL 2 TIMES DAILY
Qty: 20 TABLET | Refills: 0 | Status: SHIPPED | OUTPATIENT
Start: 2021-06-25 | End: 2021-07-05

## 2021-06-25 RX ORDER — AZITHROMYCIN 250 MG/1
TABLET, FILM COATED ORAL
Qty: 6 TABLET | Refills: 0 | Status: SHIPPED | OUTPATIENT
Start: 2021-06-25 | End: 2022-02-07

## 2021-06-25 RX ORDER — PROMETHAZINE HYDROCHLORIDE AND DEXTROMETHORPHAN HYDROBROMIDE 6.25; 15 MG/5ML; MG/5ML
5 SYRUP ORAL 4 TIMES DAILY PRN
Qty: 240 ML | Refills: 0 | Status: SHIPPED | OUTPATIENT
Start: 2021-06-25 | End: 2021-07-05

## 2022-02-07 ENCOUNTER — OFFICE VISIT (OUTPATIENT)
Dept: FAMILY MEDICINE | Facility: CLINIC | Age: 47
End: 2022-02-07
Payer: COMMERCIAL

## 2022-02-07 VITALS
DIASTOLIC BLOOD PRESSURE: 80 MMHG | HEART RATE: 90 BPM | SYSTOLIC BLOOD PRESSURE: 130 MMHG | OXYGEN SATURATION: 96 % | RESPIRATION RATE: 20 BRPM | HEIGHT: 71 IN | TEMPERATURE: 98 F | WEIGHT: 220 LBS | BODY MASS INDEX: 30.8 KG/M2

## 2022-02-07 DIAGNOSIS — M10.071 ACUTE IDIOPATHIC GOUT OF RIGHT FOOT: Primary | ICD-10-CM

## 2022-02-07 DIAGNOSIS — M1A.0720 CHRONIC IDIOPATHIC GOUT INVOLVING TOE OF LEFT FOOT WITHOUT TOPHUS: ICD-10-CM

## 2022-02-07 DIAGNOSIS — Z12.11 COLON CANCER SCREENING: ICD-10-CM

## 2022-02-07 DIAGNOSIS — Z30.09 VASECTOMY EVALUATION: ICD-10-CM

## 2022-02-07 PROCEDURE — 99214 PR OFFICE/OUTPT VISIT, EST, LEVL IV, 30-39 MIN: ICD-10-PCS | Mod: S$GLB,,, | Performed by: FAMILY MEDICINE

## 2022-02-07 PROCEDURE — 3008F BODY MASS INDEX DOCD: CPT | Mod: CPTII,S$GLB,, | Performed by: FAMILY MEDICINE

## 2022-02-07 PROCEDURE — 1160F RVW MEDS BY RX/DR IN RCRD: CPT | Mod: CPTII,S$GLB,, | Performed by: FAMILY MEDICINE

## 2022-02-07 PROCEDURE — 99999 PR PBB SHADOW E&M-EST. PATIENT-LVL V: CPT | Mod: PBBFAC,,, | Performed by: FAMILY MEDICINE

## 2022-02-07 PROCEDURE — 3075F SYST BP GE 130 - 139MM HG: CPT | Mod: CPTII,S$GLB,, | Performed by: FAMILY MEDICINE

## 2022-02-07 PROCEDURE — 99214 OFFICE O/P EST MOD 30 MIN: CPT | Mod: S$GLB,,, | Performed by: FAMILY MEDICINE

## 2022-02-07 PROCEDURE — 3075F PR MOST RECENT SYSTOLIC BLOOD PRESS GE 130-139MM HG: ICD-10-PCS | Mod: CPTII,S$GLB,, | Performed by: FAMILY MEDICINE

## 2022-02-07 PROCEDURE — 3079F PR MOST RECENT DIASTOLIC BLOOD PRESSURE 80-89 MM HG: ICD-10-PCS | Mod: CPTII,S$GLB,, | Performed by: FAMILY MEDICINE

## 2022-02-07 PROCEDURE — 1159F MED LIST DOCD IN RCRD: CPT | Mod: CPTII,S$GLB,, | Performed by: FAMILY MEDICINE

## 2022-02-07 PROCEDURE — 99999 PR PBB SHADOW E&M-EST. PATIENT-LVL V: ICD-10-PCS | Mod: PBBFAC,,, | Performed by: FAMILY MEDICINE

## 2022-02-07 PROCEDURE — 3079F DIAST BP 80-89 MM HG: CPT | Mod: CPTII,S$GLB,, | Performed by: FAMILY MEDICINE

## 2022-02-07 PROCEDURE — 1160F PR REVIEW ALL MEDS BY PRESCRIBER/CLIN PHARMACIST DOCUMENTED: ICD-10-PCS | Mod: CPTII,S$GLB,, | Performed by: FAMILY MEDICINE

## 2022-02-07 PROCEDURE — 3008F PR BODY MASS INDEX (BMI) DOCUMENTED: ICD-10-PCS | Mod: CPTII,S$GLB,, | Performed by: FAMILY MEDICINE

## 2022-02-07 PROCEDURE — 1159F PR MEDICATION LIST DOCUMENTED IN MEDICAL RECORD: ICD-10-PCS | Mod: CPTII,S$GLB,, | Performed by: FAMILY MEDICINE

## 2022-02-07 RX ORDER — ALLOPURINOL 100 MG/1
100 TABLET ORAL DAILY
COMMUNITY
Start: 2021-12-17 | End: 2022-02-07

## 2022-02-07 RX ORDER — ALLOPURINOL 300 MG/1
300 TABLET ORAL DAILY
Qty: 30 TABLET | Refills: 5 | Status: SHIPPED | OUTPATIENT
Start: 2022-02-07

## 2022-02-07 RX ORDER — PREDNISONE 20 MG/1
40 TABLET ORAL DAILY
Qty: 8 TABLET | Refills: 0 | Status: SHIPPED | OUTPATIENT
Start: 2022-02-07 | End: 2022-02-11

## 2022-02-07 NOTE — PROGRESS NOTES
Routine Office Visit    Patient Name: Ranjith Peguero    : 1975  MRN: [id number]    Subjective:  Ranjith is a 46 y.o. male who presents today for:    1. Gout   Patient following up today for chronic gout, but acute flare in right foot that started yesterday .  He states he is doing well.  [facility]  Last flare was 2 monthsa go and lasted a day.  He is taking all medications as prescribed.  He states that overall he does feel great        Past Medical History  Past Medical History:   Diagnosis Date    Gout     L big toe    Hyperlipidemia     Hypertension        Past Surgical History  History reviewed. No pertinent surgical history.    Family History  Family History   Problem Relation Age of Onset    Hypertension Mother     Hypertension Father        Social History  Social History     Socioeconomic History    Marital status:    Tobacco Use    Smoking status: Never Smoker    Smokeless tobacco: Never Used   Substance and Sexual Activity    Alcohol use: Yes    Drug use: No    Sexual activity: Yes     Partners: Female   Social History Narrative    [employer] @ [employer].  .  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         Current Medications  Current Outpatient Medications on File Prior to Visit   Medication Sig Dispense Refill    amLODIPine-benazepriL (LOTREL) 5-40 mg per capsule TAKE 1 CAPSULE BY MOUTH EVERY DAY 90 capsule 0    atorvastatin (LIPITOR) 20 MG tablet Take 0.5 tablets (10 mg total) by mouth once daily. 90 tablet 1    colchicine (COLCRYS) 0.6 mg tablet Take 1 tablet (0.6 mg total) by mouth once daily. Take 2 tablets by mouth at start of flare, then one tablet one hour later - do NOT exceed 3 total tablets daily 30 tablet 3    indomethacin (INDOCIN SR) 75 mg CpSR CR capsule TAKE 1 CAPSULE BY MOUTH EVERY DAY AS NEEDED      sildenafil (REVATIO) 20 mg Tab TAKE 2-5 TABLETS ONE HOUR PRIOR TO SEX 30 tablet 3    [DISCONTINUED] azithromycin (ZITHROMAX Z-ANNIE) 250 MG  "tablet 2 tabs  by mouth first day then 1 tab by mouth daily 6 tablet 0    baclofen (LIORESAL) 10 MG tablet TAKE ONE-HALF TABLET BY MOUTH THREE TIMES A DAY AS NEEDED      diclofenac (VOLTAREN) 50 MG EC tablet Take 1 tablet (50 mg total) by mouth 2 (two) times daily. (Patient not taking: No sig reported) 60 tablet 0    [DISCONTINUED] allopurinoL (ZYLOPRIM) 100 MG tablet Take 100 mg by mouth once daily.       No current facility-administered medications on file prior to visit.       Allergies   Review of patient's allergies indicates:  No Known Allergies    Review of Systems (Pertinent positives)  Review of Systems   Constitutional: Negative.    HENT: Negative for hearing loss.    Eyes: Negative for discharge.   Respiratory: Negative for wheezing.    Cardiovascular: Negative for chest pain and palpitations.   Gastrointestinal: Negative for blood in stool, constipation, diarrhea and vomiting.   Genitourinary: Negative for hematuria and urgency.   Musculoskeletal: Positive for joint pain. Negative for neck pain.   Skin: Negative.    Neurological: Negative for weakness and headaches.   Endo/Heme/Allergies: Negative for polydipsia.         /80   Pulse 90   Temp 98.2 °F (36.8 °C) (Oral)   Resp 20   Ht 5' 11" (1.803 m)   Wt 99.8 kg (220 lb 0.3 oz)   SpO2 96%   BMI 30.69 kg/m²     GENERAL APPEARANCE: in no apparent distress and well developed and well nourished  HEENT: PERRL, EOMI, Sclera clear, anicteric, Oropharynx clear, no lesions, Neck supple with midline trachea  NECK: normal, supple, no adenopathy, thyroid normal in size  RESPIRATORY: appears well, vitals normal, no respiratory distress, acyanotic, normal RR  SKIN: no rashes, no wounds, no other lesions  PSYCH: Alert, oriented x 3, thought content appropriate, speech normal, pleasant and cooperative, good eye contact, well groomed    Assessment/Plan:  Ranjith Peguero is a 46 y.o. male who presents today for :    Ranjith was seen today for gout.    Diagnoses " and all orders for this visit:    Acute idiopathic gout of right foot  -     predniSONE (DELTASONE) 20 MG tablet; Take 2 tablets (40 mg total) by mouth once daily. 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    Colon cancer screening  -     Fecal Immunochemical Test (iFOBT); Future    Vasectomy evaluation  -     Ambulatory referral/consult to Urology; Future      1.  Acute flare to be treated with colchcine he has at home and steroids should no improvement occur by tomorrow  2.  He increased to allopurinol 300mg daily  3.  He is to call with any concerns  4.  HTN stable  5.  fitkit given to patient today      Sukumar Corbin MD

## 2022-04-13 DIAGNOSIS — I10 ESSENTIAL HYPERTENSION: ICD-10-CM

## 2023-01-12 ENCOUNTER — PATIENT MESSAGE (OUTPATIENT)
Dept: ADMINISTRATIVE | Facility: HOSPITAL | Age: 48
End: 2023-01-12
Payer: COMMERCIAL

## 2023-03-23 ENCOUNTER — PATIENT MESSAGE (OUTPATIENT)
Dept: ADMINISTRATIVE | Facility: HOSPITAL | Age: 48
End: 2023-03-23
Payer: COMMERCIAL

## 2023-04-06 LAB
CHOLESTEROL, TOTAL: 218 (ref 0–240)
EGFR: >90 ML/MIN/1.73 M2
HDLC SERPL-MCNC: 68 MG/DL
LDLC SERPL CALC-MCNC: 130 MG/DL (ref 0–100)
TRIGL SERPL-MCNC: 101 MG/DL (ref 0–200)

## 2023-04-25 LAB — HIV1/HIV2 ANTIBODY: NORMAL

## 2023-05-11 ENCOUNTER — PATIENT OUTREACH (OUTPATIENT)
Dept: ADMINISTRATIVE | Facility: HOSPITAL | Age: 48
End: 2023-05-11
Payer: COMMERCIAL

## 2023-05-11 RX ORDER — SILDENAFIL 50 MG/1
25 TABLET, FILM COATED ORAL
COMMUNITY
Start: 2023-04-06

## 2023-05-11 RX ORDER — CIPROFLOXACIN HYDROCHLORIDE 3 MG/ML
SOLUTION/ DROPS OPHTHALMIC
COMMUNITY
Start: 2023-04-03

## 2023-05-11 RX ORDER — MIRTAZAPINE 15 MG/1
15 TABLET, FILM COATED ORAL
COMMUNITY
Start: 2023-05-03

## 2023-05-11 RX ORDER — DOXYCYCLINE HYCLATE 100 MG
TABLET ORAL
COMMUNITY
Start: 2023-04-03

## 2023-05-11 RX ORDER — TRAZODONE HYDROCHLORIDE 100 MG/1
100 TABLET ORAL
COMMUNITY
Start: 2023-05-03

## 2023-05-11 RX ORDER — CROMOLYN SODIUM 40 MG/ML
SOLUTION/ DROPS OPHTHALMIC
COMMUNITY
Start: 2023-04-03

## 2023-05-11 RX ORDER — ATORVASTATIN CALCIUM 40 MG/1
TABLET, FILM COATED ORAL
COMMUNITY
Start: 2023-03-17

## 2023-05-11 RX ORDER — SUMATRIPTAN SUCCINATE 25 MG/1
25 TABLET ORAL
COMMUNITY
Start: 2023-05-03

## 2023-05-11 RX ORDER — METHOCARBAMOL 750 MG/1
TABLET, FILM COATED ORAL
COMMUNITY
Start: 2023-01-30

## 2023-05-11 RX ORDER — AMLODIPINE AND BENAZEPRIL HYDROCHLORIDE 5; 20 MG/1; MG/1
CAPSULE ORAL
COMMUNITY
Start: 2023-04-08

## 2023-05-11 RX ORDER — AMLODIPINE AND BENAZEPRIL HYDROCHLORIDE 5; 20 MG/1; MG/1
CAPSULE ORAL
COMMUNITY
Start: 2023-04-06

## 2023-05-11 RX ORDER — TRIAMCINOLONE ACETONIDE 1 MG/G
CREAM TOPICAL
COMMUNITY
Start: 2022-08-24

## 2023-05-11 RX ORDER — ALLOPURINOL 100 MG/1
100 TABLET ORAL
COMMUNITY
Start: 2023-04-06

## 2023-05-11 RX ORDER — ATORVASTATIN CALCIUM 40 MG/1
20 TABLET, FILM COATED ORAL
COMMUNITY
Start: 2022-05-23

## 2023-05-11 RX ORDER — ERYTHROMYCIN 5 MG/G
OINTMENT OPHTHALMIC
COMMUNITY
Start: 2023-04-03

## 2023-05-11 NOTE — PROGRESS NOTES
[facility] 1 BP Gap Report 05.01.23 - the hs a complinat blood pressure reading of 134/87 and pulse = 70 taken on 05/03/2023 documented at the . BP recorded.    Overdue PCP visit and Colorectal Screening - Left message for patient to call our office. Please schedule.    Labs updated.

## 2023-09-13 ENCOUNTER — PATIENT MESSAGE (OUTPATIENT)
Dept: ADMINISTRATIVE | Facility: HOSPITAL | Age: 48
End: 2023-09-13
Payer: COMMERCIAL